# Patient Record
Sex: FEMALE | Race: WHITE | NOT HISPANIC OR LATINO | Employment: UNEMPLOYED | URBAN - METROPOLITAN AREA
[De-identification: names, ages, dates, MRNs, and addresses within clinical notes are randomized per-mention and may not be internally consistent; named-entity substitution may affect disease eponyms.]

---
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
## 2025-01-04 DIAGNOSIS — G47.09 OTHER INSOMNIA: ICD-10-CM

## 2025-01-06 RX ORDER — MIRTAZAPINE 7.5 MG/1
7.5 TABLET, FILM COATED ORAL AT BEDTIME
Qty: 90 TABLET | Refills: 0 | Status: SHIPPED | OUTPATIENT
Start: 2025-01-06

## 2025-01-06 NOTE — TELEPHONE ENCOUNTER
"Pharmacy comment: REQUEST FOR 90 DAYS PRESCRIPTION. DX Code Needed.       Request for medication refill:  mirtazapine (REMERON) 7.5 MG tablet     Providers if patient needs an appointment and you are willing to give a one month supply please refill for one month and  send a letter/MyChart using \".SMILLIMITEDREFILL\" .smillimited and route chart to \"P SMI \" (Giving one month refill in non controlled medications is strongly recommended before denial)    If refill has been denied, meaning absolutely no refills without visit, please complete the smart phrase \".smirxrefuse\" and route it to the \"P SMI MED REFILLS\"  pool to inform the patient and the pharmacy.    Kamran Genao [facility]      "

## 2025-01-16 ENCOUNTER — MYC REFILL (OUTPATIENT)
Dept: FAMILY MEDICINE | Facility: CLINIC | Age: 20
End: 2025-01-16
Payer: COMMERCIAL

## 2025-01-16 DIAGNOSIS — F41.1 GENERALIZED ANXIETY DISORDER: ICD-10-CM

## 2025-01-16 DIAGNOSIS — F40.10 SOCIAL ANXIETY DISORDER: ICD-10-CM

## 2025-01-16 DIAGNOSIS — F90.0 ADHD (ATTENTION DEFICIT HYPERACTIVITY DISORDER), INATTENTIVE TYPE: ICD-10-CM

## 2025-01-16 RX ORDER — METHYLPHENIDATE HYDROCHLORIDE 54 MG/1
54 TABLET ORAL DAILY
Qty: 30 TABLET | Refills: 0 | Status: CANCELLED | OUTPATIENT
Start: 2025-01-16

## 2025-01-16 RX ORDER — BUPROPION HYDROCHLORIDE 300 MG/1
300 TABLET ORAL EVERY MORNING
Qty: 30 TABLET | Refills: 1 | Status: SHIPPED | OUTPATIENT
Start: 2025-01-16

## 2025-01-16 NOTE — TELEPHONE ENCOUNTER
"Last seen 12/11/24    Request for medication refill:buPROPion (WELLBUTRIN XL) 300 MG 24 hr tablet     Providers if patient needs an appointment and you are willing to give a one month supply please refill for one month and  send a letter/MyChart using \".SMILLIMITEDREFILL\" .smillimited and route chart to \"P [facility] \" (Giving one month refill in non controlled medications is strongly recommended before denial)    If refill has been denied, meaning absolutely no refills without visit, please complete the smart phrase \".smirxrefuse\" and route it to the \"P [facility] MED REFILLS\"  pool to inform the patient and the pharmacy.    Briseida Grigsby RN      "

## 2025-01-16 NOTE — TELEPHONE ENCOUNTER
Methylphenidate was sent on 1/10/25 and will drop another script on 2/9/25    Briseida Grigsby RN

## 2025-01-29 ENCOUNTER — VIRTUAL VISIT (OUTPATIENT)
Dept: PSYCHOLOGY | Facility: CLINIC | Age: 20
End: 2025-01-29
Payer: COMMERCIAL

## 2025-01-29 DIAGNOSIS — F33.1 MODERATE EPISODE OF RECURRENT MAJOR DEPRESSIVE DISORDER (H): ICD-10-CM

## 2025-01-29 DIAGNOSIS — F90.0 ADHD (ATTENTION DEFICIT HYPERACTIVITY DISORDER), INATTENTIVE TYPE: ICD-10-CM

## 2025-01-29 DIAGNOSIS — F41.1 GENERALIZED ANXIETY DISORDER: Primary | ICD-10-CM

## 2025-01-29 PROCEDURE — 90837 PSYTX W PT 60 MINUTES: CPT | Mod: 95

## 2025-01-29 NOTE — PROGRESS NOTES
[facility]                                     Progress Note    Patient Name: lEba Adams  Date: 1/29/2025         Service Type: Individual      Session Start Time: 3:02 pm  Session End Time: 3:55 pm     Session Length: 53 min    Session #: 4    Attendees: Client attended alone    Service Modality:  Video Visit:      Provider verified identity through the following two step process.  Patient provided:  Patient address    Telemedicine Visit: The patient's condition can be safely assessed and treated via synchronous audio and visual telemedicine encounter.      Reason for Telemedicine Visit: Patient convenience (e.g. access to timely appointments / distance to available provider)    Originating Site (Patient Location): Patient's home    Distant Site (Provider Location): Provider Remote Setting- Home Office    Consent:  The patient/guardian has verbally consented to: the potential risks and benefits of telemedicine (video visit) versus in person care; bill my insurance or make self-payment for services provided; and responsibility for payment of non-covered services.     Patient would like the video invitation sent by:  My Chart    Mode of Communication:  Video Conference via [employer]    Distant Location (Provider):  Off-site    As the provider I attest to compliance with applicable laws and regulations related to telemedicine.    DATA  Interactive Complexity: No  Crisis: No       Progress Since Last Session (Related to Symptoms / Goals / Homework):   Symptoms: No change , worsening depression    Homework:  n/a      Episode of Care Goals: No improvement - CONTEMPLATION (Considering change and yet undecided); Intervened by assessing the negative and positive thinking (ambivalence) about behavior change     Current / Ongoing Stressors and Concerns:   Fear and anxiety regarding administration efforts. Nightmares with fear and death themes. Struggle with lack of support from extended family  "regarding trans identity. Overwhelm with life-not sure about future, in community college, struggling with homework, attention, task completion, motivation.. Lives in home with mom and her girlfriend, both who also have ADHD. Both work outside of the home most of the time.  Would like to have more activities as a family.   HX parents , moved around a lot, Dad had a previous marriage with a woman who self harmed, leading patient to move to [facility] in 2020. Dad disapproves of mental health supports, medication, therapy. Mom is supportive of medication, understands better.        Treatment Objective(s) Addressed in This Session:   Orientation to current stressors, areas of concern/priority, history  Sleep hygiene  Mindfulness \"Leaves on a stream\"  Mental health support resources     Intervention:   Provided active listening and validation, inquiry regarding present stressors, past influences on current functioning. Shared ADHD skills to support attention to executive functioning, schoolwork. Discussed movement toward engaging in soothing, and enjoyable activities, ie drawing. Provided psychoed on grounding skills       Assessments completed prior to visit:  The following assessments were completed by patient for this visit:  PHQ2:       12/11/2024    10:02 AM 5/23/2024     8:39 AM 11/28/2023     2:46 PM 11/7/2022     4:02 PM 10/7/2022     3:48 PM 5/24/2022     4:00 PM 5/10/2022     2:46 PM   PHQ-2 ( 1999 [employer])   Q1: Little interest or pleasure in doing things 3 2 2 1 2 1 2   Q2: Feeling down, depressed or hopeless 2 2 2 1 2 2 3   PHQ-2 Score 5  4 4       PHQ-2 Total Score (12-17 Years)- Positive if 3 or more points; Administer PHQ-A if positive    2 4 3 5   Q1: Little interest or pleasure in doing things Nearly every day More than half the days        Q2: Feeling down, depressed or hopeless More than half the days More than half the days        PHQ-2 Score 5 4            Patient-reported     PHQ9:       " 10/7/2022     3:49 PM 11/29/2023     5:00 PM 3/15/2024     2:52 PM 5/23/2024     8:39 AM 10/31/2024     9:12 AM 12/9/2024     2:44 PM 12/11/2024    10:02 AM   PHQ-9 SCORE   PHQ-9 Total Score MyChart   18 (Moderately severe depression) 16 (Moderately severe depression) 18 (Moderately severe depression) 16 (Moderately severe depression) 17 (Moderately severe depression)   PHQ-9 Total Score  18 18 16 18  16  17    PHQ-A Total Score 16             Patient-reported     GAD2:       10/31/2024     9:12 AM 12/9/2024     2:44 PM   KRIS-2   Feeling nervous, anxious, or on edge 1 1   Not being able to stop or control worrying 1 1   KRIS-2 Total Score 2  2        Patient-reported     GAD7:       12/14/2021    11:27 AM 12/15/2021     4:02 PM 1/6/2022     5:00 PM 5/10/2022     2:46 PM 5/24/2022     4:00 PM 11/29/2023     5:00 PM 3/15/2024     2:52 PM   KRIS-7 SCORE   Total Score       10 (moderate anxiety)   Total Score 10 11 12 12 5 8 10     CAGE-AID:       3/15/2024     3:03 PM   CAGE-AID Total Score   Total Score 0   Total Score MyChart 0 (A total score of 2 or greater is considered clinically significant)     PROMIS 10-Global Health (only subscores and total score):       10/31/2024     9:13 AM   PROMIS-10 Scores Only   Global Mental Health Score 5    Global Physical Health Score 12    PROMIS TOTAL - SUBSCORES 17        Patient-reported     Taylor Suicide Severity Rating Scale (Lifetime/Recent)      5/19/2021    10:25 PM   Taylor Suicide Severity Rating (Lifetime/Recent)   Q1 Wished to be Dead (Past Month) yes   Q2 Suicidal Thoughts (Past Month) yes   Q3 Suicidal Thought Method no   Q4 Suicidal Intent without Specific Plan yes   Q5 Suicide Intent with Specific Plan no   Q6 Suicide Behavior (Lifetime) no   Level of Risk per Screen high risk         ASSESSMENT: Current Emotional / Mental Status (status of significant symptoms):   Risk status (Self / Other harm or suicidal ideation)   Patient denies current fears or concerns  for personal safety.   Patient reports the following current or recent suicidal ideation or behaviors: passive ideation.   Patient denies current or recent homicidal ideation or behaviors.   Patient denies current or recent self injurious behavior or ideation.   Patient denies other safety concerns.   Patient reports there has been no change in risk factors since their last session.     Patient reports there has been no change in protective factors since their last session.     Recommended that patient call 911 or go to the local ED should there be a change in any of these risk factors     Appearance:   Appropriate    Eye Contact:   Good    Psychomotor Behavior: Restless    Attitude:   Cooperative  Pleasant   Orientation:   All   Speech    Rate / Production: Impoverished     Volume:  Soft    Mood:    Anxious  Depressed    Affect:    Tearful   Thought Content:  Clear    Thought Form:  Coherent    Insight:    Fair      Medication Review:   No changes to current psychiatric medication(s)     Medication Compliance:   Yes       Changes in Health Issues:   None reported     Chemical Use Review:   Substance Use: Chemical use reviewed, no active concerns identified      Tobacco Use: No current tobacco use.      Diagnosis:  1. Generalized anxiety disorder    2. ADHD (attention deficit hyperactivity disorder), inattentive type    3. Moderate episode of recurrent major depressive disorder (H)      Collateral Reports Completed:   Communicated with: PCP    PLAN: (Patient Tasks / Therapist Tasks / Other)  Try sleep hygiene, mindfulness, exercise (walking), grounding skills, take medication, attend appointments (use reminders) use mental health crisis resources as needed     Mildred Roa, LICSW 1/29/25                                                         ______________________________________________________________________  Answers submitted by the patient for this visit:  Patient Health Questionnaire (Submitted on  12/9/2024)  If you checked off any problems, how difficult have these problems made it for you to do your work, take care of things at home, or get along with other people?: Extremely difficult  PHQ9 TOTAL SCORE: 16

## 2025-02-05 ENCOUNTER — OFFICE VISIT (OUTPATIENT)
Dept: FAMILY MEDICINE | Facility: CLINIC | Age: 20
End: 2025-02-05
Payer: COMMERCIAL

## 2025-02-05 VITALS
OXYGEN SATURATION: 97 % | DIASTOLIC BLOOD PRESSURE: 80 MMHG | BODY MASS INDEX: 33.61 KG/M2 | HEIGHT: 65 IN | SYSTOLIC BLOOD PRESSURE: 131 MMHG | HEART RATE: 117 BPM | TEMPERATURE: 98 F | RESPIRATION RATE: 18 BRPM

## 2025-02-05 DIAGNOSIS — F64.0 GENDER DYSPHORIA IN ADULT: Primary | ICD-10-CM

## 2025-02-05 DIAGNOSIS — R00.0 TACHYCARDIA: ICD-10-CM

## 2025-02-05 DIAGNOSIS — Z97.5 NEXPLANON IN PLACE: ICD-10-CM

## 2025-02-05 LAB
ERYTHROCYTE [DISTWIDTH] IN BLOOD BY AUTOMATED COUNT: 13.1 % (ref 10–15)
EST. AVERAGE GLUCOSE BLD GHB EST-MCNC: 111 MG/DL
HBA1C MFR BLD: 5.5 % (ref 0–5.6)
HCT VFR BLD AUTO: 40.4 % (ref 35–53)
HGB BLD-MCNC: 12.8 G/DL (ref 11.7–17.7)
HOLD SPECIMEN: NORMAL
MCH RBC QN AUTO: 26 PG (ref 26.5–33)
MCHC RBC AUTO-ENTMCNC: 31.7 G/DL (ref 31.5–36.5)
MCV RBC AUTO: 82 FL (ref 78–100)
PLATELET # BLD AUTO: 415 10E3/UL (ref 150–450)
RBC # BLD AUTO: 4.93 10E6/UL (ref 3.8–5.9)
WBC # BLD AUTO: 9.9 10E3/UL (ref 4–11)

## 2025-02-05 ASSESSMENT — PATIENT HEALTH QUESTIONNAIRE - PHQ9
SUM OF ALL RESPONSES TO PHQ QUESTIONS 1-9: 15
10. IF YOU CHECKED OFF ANY PROBLEMS, HOW DIFFICULT HAVE THESE PROBLEMS MADE IT FOR YOU TO DO YOUR WORK, TAKE CARE OF THINGS AT HOME, OR GET ALONG WITH OTHER PEOPLE: VERY DIFFICULT
SUM OF ALL RESPONSES TO PHQ QUESTIONS 1-9: 15

## 2025-02-05 NOTE — PROGRESS NOTES
HPI     Name and pronouns: Shen He/him  Patient has primary care outside of [facility]? No, established at [facility]  Seeking masculinizing hormone therapy  How referred to [facility]? PCP/has been in [facility] [place] [facility] in the past    Presents to clinic today with:   Self    In 2022, when Shen was 16 he was seeking care for gender dysphoria and mental health including panic attacks, anxiety, and depression. He was also seeing psychiatry at that time. Stressors included financial instability, moving frequently, parental divorce, and disagreement among extended family about seeking care via hormone therapy. When I last saw him in 2021, he was additionally experiencing suicidal ideation. Now social situation is stable and supportive, lives with mom and partner who is also trans. Estranged from dad which is a relief. In college.         Problem List     Patient Active Problem List   Diagnosis    Asthma    Anxiety    Panic disorder without agoraphobia    Severe episode of recurrent major depressive disorder, without psychotic features (H)    Social anxiety disorder    Vitamin D deficiency    Moderate episode of recurrent major depressive disorder (H)    Attention deficit hyperactivity disorder (ADHD), unspecified ADHD type    Generalized anxiety disorder    Gender dysphoria in adult    Nexplanon in place         Past Medical History     Past Medical History:   Diagnosis Date    ADHD     Anxiety     Depressive disorder     Uncomplicated asthma      Allergies   Allergen Reactions    Seasonal Allergies      Current Outpatient Medications   Medication Sig Dispense Refill    albuterol (PROAIR HFA/PROVENTIL HFA/VENTOLIN HFA) 108 (90 Base) MCG/ACT inhaler Inhale 1-2 puffs into the lungs every 4 hours as needed for shortness of breath, wheezing or cough 18 g 3    buPROPion (WELLBUTRIN XL) 300 MG 24 hr tablet Take 1 tablet (300 mg) by mouth every morning. 30 tablet 1    FLUoxetine (PROZAC) 20 MG capsule TAKE 1 CAPSULE BY MOUTH EVERY  DAY 90 capsule 3    loratadine (CLARITIN) 10 MG tablet TAKE 1 TABLET (10 MG) BY MOUTH DAILY. 90 tablet 3    methylphenidate HCl ER, OSM, (CONCERTA) 54 MG CR tablet Take 1 tablet (54 mg) by mouth daily. 30 tablet 0    [START ON 2/9/2025] methylphenidate HCl ER, OSM, (CONCERTA) 54 MG CR tablet Take 1 tablet (54 mg) by mouth daily. 30 tablet 0    methylphenidate HCl ER, OSM, (CONCERTA) 54 MG CR tablet Take 1 tablet (54 mg) by mouth daily. 30 tablet 0    mirtazapine (REMERON) 7.5 MG tablet TAKE 1 TABLET BY MOUTH AT BEDTIME. 90 tablet 0     History   Smoking Status    Never   Smokeless Tobacco    Never         Surgical History     Past Surgical History:   Procedure Laterality Date    WISDOM TOOTH EXTRACTION          Family History     Family History   Problem Relation Age of Onset    Hypertension Mother     Attention Deficit Disorder Mother     Depression Mother     Sleep Apnea Mother     Heart Disease Maternal Grandfather     Coronary Artery Disease Other        Living environment, Safety     Social History     Socioeconomic History    Marital status: Single     Spouse name: None    Number of children: None    Years of education: None    Highest education level: None   Tobacco Use    Smoking status: Never    Smokeless tobacco: Never   Vaping Use    Vaping status: Never Used   Substance and Sexual Activity    Alcohol use: Never    Drug use: Never    Sexual activity: Never   Social History Narrative    Lives with mom     Estranged from dad      Social Drivers of Health     Financial Resource Strain: Low Risk  (3/15/2024)    Financial Resource Strain     Within the past 12 months, have you or your family members you live with been unable to get utilities (heat, electricity) when it was really needed?: No   Food Insecurity: Low Risk  (3/15/2024)    Food Insecurity     Within the past 12 months, did you worry that your food would run out before you got money to buy more?: No     Within the past 12 months, did the food you  bought just not last and you didn t have money to get more?: No   Transportation Needs: Low Risk  (3/15/2024)    Transportation Needs     Within the past 12 months, has lack of transportation kept you from medical appointments, getting your medicines, non-medical meetings or appointments, work, or from getting things that you need?: No   Physical Activity: Insufficiently Active (3/15/2024)    Exercise Vital Sign     Days of Exercise per Week: 1 day     Minutes of Exercise per Session: 20 min   Stress: Stress Concern Present (3/15/2024)    [employer] [place] [employer] - Occupational Stress Questionnaire     Feeling of Stress : Rather much   Social Connections: Unknown (3/15/2024)    Social Connection and Isolation Panel [NHANES]     Frequency of Social Gatherings with Friends and Family: Never   Interpersonal Safety: Low Risk  (12/11/2024)    Interpersonal Safety     Do you feel physically and emotionally safe where you currently live?: Yes     Within the past 12 months, have you been hit, slapped, kicked or otherwise physically hurt by someone?: No     Within the past 12 months, have you been humiliated or emotionally abused in other ways by your partner or ex-partner?: No   Housing Stability: Low Risk  (3/15/2024)    Housing Stability     Do you have housing? : Yes     Are you worried about losing your housing?: No      Who lives in your household? Mom    Has anyone hurt you physically, for example by pushing, hitting, slapping or kicking you or forcing you to have sex? Denies  Do you feel threatened or controlled by a partner, ex-partner or anyone in your life? Denies      Mental Health Assessment     Have you ever felt depressed because your gender identity and body don t match? Feels that a significant portion of mental health is related to gender.     Mental Health diagnosis history Yes  Mental health hospitalizations Yes, around June 2021 for suicidal ideation.   History of suicide  attempts  No  Current suicidal thoughts?  No safety concerns.   Self harm   History in past   Yes   Current   No. No safety concerns.   Non gender related Therapist? Yes  Gender therapist?    No    - ADHD  - Depression   - Insomnia    He reports that mental health has been poorer recently, especially related to sociopolitical climate. He is worried about violence and having a paper trail of engaging in gender care. Is not having panic attacks often. Panic attacks have decreased to a couple of times a year since starting ADHD medication.     Therapy with Mildred Roa.         12/9/2024     2:44 PM 12/11/2024    10:02 AM 2/5/2025     9:14 AM   PHQ-9 SCORE   PHQ-9 Total Score MyChart 16 (Moderately severe depression) 17 (Moderately severe depression) 15 (Moderately severe depression)   PHQ-9 Total Score 16  17  15        Patient-reported         5/24/2022     4:00 PM 11/29/2023     5:00 PM 3/15/2024     2:52 PM   KRIS-7 SCORE   Total Score   10 (moderate anxiety)   Total Score 5 8 10           Gender Journey     Gender identity   What words do you use to describe your gender identity? Male although at school he will allow they/them pronouns to give others flexibility.  What about gender expression? Feels more masculine after cutting hair shorter.   Where do you want your presentation to be? Flat chest, fewer curves, body weight distribution changes.  What sex were you assigned at birth? female      Gender history  When did you first recognize that your body and gender identity didn t match?  Please refer to original consult in 2021.    Pre-pubertal experience   Please refer to original consult in 2021.    Pubertal experience  Please refer to original consult in 2021.      Current body symptoms  What parts of your body or presentation make you feel uncomfortable or cause dysphoria?  Breast tissue.  Is worried about masculinizing while still having breast tissue.    Have you ever seen a healthcare provider for  "gender-affirming medical interventions? Yes. Is working with PCP. Patient reports that plan with PCP is to improve mental health prior to starting testosterone.     Other types of supports you are using or want to start using:   Haircuts/style and gender affirmative clothing. Getting haircuts is a source of gender euphoria    Social supports  Who supports you for you?   Relationship with mom is okay. Mom is supportive. Mom's partner is supportive.     Dad and grandparents are not supportive.     How do you spend your time? (Social activities, extracurricular activities, school, sports, volunteering, susan based activities, community involvement)  Is in college part time at [employer]. Has tried to join queer clubs at school but has not fit with his schedule.     Draws, plays video games.     How well is your gender identity accepted and supported in each of these environments?  Is \"sort of\" out at school. Will give hey/they pronouns if asked by anyone.     Embodiment goals in gender identity alignment  I would like these parts of my body to stay the same:     I am interested in changing these parts of my body or presentation:   Top surgery, voice lowering, body fat redistribution.     Nexplanon has suppressed periods but is having a return of regular periods and heavier flow. Nexplanon was placed around 3 years ago.          Review of Systems:   Review of Systems   ROS: 10 point ROS neg other than the symptoms noted above in the HPI.    This document serves as a record of the services and decisions personally performed and made by Candelaria Barnes MD. It was created on his/her behalf by Caleb Stanley, a trained medical scribe. The creation of this document is based the provider's statements to the medical scribe.  Angella Stanley 3:04 PM, February 5, 2025          Physical Exam:     Vitals:    02/05/25 1447   BP: 131/80   Pulse: 117   Resp: 18   Temp: 98  F (36.7  C)   TempSrc: Oral " "  SpO2: 97%   Height: 1.651 m (5' 5\")     GENERAL:: healthy, alert and no distress  RESP: lungs clear to auscultation - no rales, no rhonchi, no wheezes  CV: regular rates and rhythm, repeat pulse 100, normal S1 S2, no S3 or S4 and no murmur, no click or rub -  PSYCH: Dressed appropriately for weather and occasion. Behavior cooperative. Eye contact normal. Speech is regular in rate, volume, and prosody. Thought content negative for safety concerns. Thought process linear and logical. Fidgeting a bit with his hands. Affect anxious, full range and appropriate.     Assessment and Plan      Shen is a 19 year old individual that uses  pronouns He/Him/His/Himself that presents today for interest in masculinizing treatment to better align their body with their gender identity.    Assessment & Plan   Gender dysphoria in adult  Long standing gender dysphoria and now has been seeing this clinic since 2021 with the intention of starting masculinizing therapy with testosterone. Has excellent support through mom and her partner, therapy, and is estranged from other members of the family who historically have not been supportive. In the last 4 years has had a very consistent and stable gender identity. Mental health has improved in terms of cutting urges which were present earlier but haven't more recently, though anxiety with the current sociopolitical climate is high. Panic attacks have greatly reduced. Feels that strongest barrier to mental health improvement is lack of gender affirming medical care. Given that it has been since 2022 for labs, we will repeat those today. He reports a concern of possible low iron in the past, so we did reflex to iron studies. This will complete his physical workup. We did address fertility and contraception. He has no current romantic or sexual relationships. Co-occurring medical concerns are limited to mildly elevated triglycerides and carrying extra weight, neither of which are " contraindications. Mental health outcomes are likely to be improved with treatments. In addition, he is seeing his PCP regularly for medication management of his anxiety.     I am recommending he follow-up with PCP to review labs and begin the informed consent process. I also asked care coordination to schedule an additional visit 2 weeks following his next appointment to continue this process. He's aware that this process may take some time. Plan to pursue topical testosterone therapy. Encouraged him to continue to engage with other LGBTQ organizations and suggested a visit to [facility]. In the mean time, he will also explore some top surgeons.   - Lipid panel reflex to direct LDL Non-fasting  - Comprehensive metabolic panel  - Hemoglobin A1c  - CBC with Platelets and Reflex to Iron Studies  - Lipid panel reflex to direct LDL Non-fasting  - Comprehensive metabolic panel  - Hemoglobin A1c  - CBC with Platelets and Reflex to Iron Studies  - Iron & Iron Binding Capacity  - Ferritin    Tachycardia  This was notable initially after rooming, but it improved greatly. I suspect related to anxiety and medications as he recently increased bupropion dose and is additionally on methylphenidate.     Nexplanon in place  Has had it for 3 years. Feels that bleeding has gotten more persistent. He would like to see if this changes with testosterone therapy and if not would like to remove and replace.     51 minutes spent by me on the date of the encounter doing chart review, history and exam, documentation and further activities per the note    No follow-ups on file.      Embodiment Goals  Educated about treatments for young people are directed to symptom management, and in this case, should focus on: top surgery, fat redistribution, voice change    This patient meets DSM V Criteria for gender dysphoria in adults and adolescents.

## 2025-02-05 NOTE — PATIENT INSTRUCTIONS
Consider checking out [facility].   Top Surgeon options  Willian Garcia [phone] at [facility]  Radha Zhang at [facility] [place] [phone]  Calos Walters  I will message Dr. Laurent. Follow-up with her as scheduled.

## 2025-02-05 NOTE — Clinical Note
Caty: saw Shen today/. So anxious about current sociopolitical environment, but I think tachycardia could be anxiety or even from combo of wellbutrin and stimulants. Something to think about. I think his anxiety is also largely driven by dysphoria and after 4 years of considering testosterone, it is time. I think testosterone will be stabilizing and not anxiety provoking. I repeated labs since it has been 2 years, and did top surgery referrals. He will see you in 2 weeks for anxiety and review labs. If you have time, start talking informed consent for testosterone (and you will get as far as you get - he knows could be multiple visits). He wants top;ical. Probablyl needs 2 pumps daily . Already addressed fertility and has nexplanon. Let me know if you have ?'s

## 2025-02-06 LAB
ALBUMIN SERPL BCG-MCNC: 4.6 G/DL (ref 3.5–5.2)
ALP SERPL-CCNC: 126 U/L (ref 40–260)
ALT SERPL W P-5'-P-CCNC: 20 U/L (ref 0–50)
ANION GAP SERPL CALCULATED.3IONS-SCNC: 13 MMOL/L (ref 7–15)
AST SERPL W P-5'-P-CCNC: 21 U/L (ref 0–35)
BILIRUB SERPL-MCNC: <0.2 MG/DL
BUN SERPL-MCNC: 9.4 MG/DL (ref 6–20)
CALCIUM SERPL-MCNC: 9.7 MG/DL (ref 8.8–10.4)
CHLORIDE SERPL-SCNC: 104 MMOL/L (ref 98–107)
CHOLEST SERPL-MCNC: 167 MG/DL
CREAT SERPL-MCNC: 0.65 MG/DL (ref 0.51–1.17)
EGFRCR SERPLBLD CKD-EPI 2021: >90 ML/MIN/1.73M2
FASTING STATUS PATIENT QL REPORTED: NO
FASTING STATUS PATIENT QL REPORTED: NO
FERRITIN SERPL-MCNC: 29 NG/ML (ref 6–409)
GLUCOSE SERPL-MCNC: 113 MG/DL (ref 70–99)
HCO3 SERPL-SCNC: 24 MMOL/L (ref 22–29)
HDLC SERPL-MCNC: 47 MG/DL
IRON BINDING CAPACITY (ROCHE): 342 UG/DL (ref 240–430)
IRON SATN MFR SERPL: 10 % (ref 15–46)
IRON SERPL-MCNC: 34 UG/DL (ref 37–157)
LDLC SERPL CALC-MCNC: 87 MG/DL
NONHDLC SERPL-MCNC: 120 MG/DL
POTASSIUM SERPL-SCNC: 4.6 MMOL/L (ref 3.4–5.3)
PROT SERPL-MCNC: 7.9 G/DL (ref 6.4–8.3)
SODIUM SERPL-SCNC: 141 MMOL/L (ref 135–145)
TRIGL SERPL-MCNC: 165 MG/DL

## 2025-02-13 ENCOUNTER — PATIENT OUTREACH (OUTPATIENT)
Dept: CARE COORDINATION | Facility: CLINIC | Age: 20
End: 2025-02-13
Payer: COMMERCIAL

## 2025-02-19 ENCOUNTER — VIRTUAL VISIT (OUTPATIENT)
Facility: CLINIC | Age: 20
End: 2025-02-19
Payer: COMMERCIAL

## 2025-02-19 DIAGNOSIS — F64.2 GENDER DYSPHORIA IN CHILDHOOD: ICD-10-CM

## 2025-02-19 DIAGNOSIS — F90.0 ADHD (ATTENTION DEFICIT HYPERACTIVITY DISORDER), INATTENTIVE TYPE: ICD-10-CM

## 2025-02-19 DIAGNOSIS — F33.1 MODERATE EPISODE OF RECURRENT MAJOR DEPRESSIVE DISORDER (H): Primary | ICD-10-CM

## 2025-02-19 DIAGNOSIS — F41.1 GENERALIZED ANXIETY DISORDER: ICD-10-CM

## 2025-02-19 PROCEDURE — 90837 PSYTX W PT 60 MINUTES: CPT | Mod: 95

## 2025-02-19 ASSESSMENT — ANXIETY QUESTIONNAIRES
GAD7 TOTAL SCORE: 9
8. IF YOU CHECKED OFF ANY PROBLEMS, HOW DIFFICULT HAVE THESE MADE IT FOR YOU TO DO YOUR WORK, TAKE CARE OF THINGS AT HOME, OR GET ALONG WITH OTHER PEOPLE?: SOMEWHAT DIFFICULT
7. FEELING AFRAID AS IF SOMETHING AWFUL MIGHT HAPPEN: SEVERAL DAYS
1. FEELING NERVOUS, ANXIOUS, OR ON EDGE: MORE THAN HALF THE DAYS
8. IF YOU CHECKED OFF ANY PROBLEMS, HOW DIFFICULT HAVE THESE MADE IT FOR YOU TO DO YOUR WORK, TAKE CARE OF THINGS AT HOME, OR GET ALONG WITH OTHER PEOPLE?: SOMEWHAT DIFFICULT
2. NOT BEING ABLE TO STOP OR CONTROL WORRYING: SEVERAL DAYS
GAD7 TOTAL SCORE: 9
1. FEELING NERVOUS, ANXIOUS, OR ON EDGE: MORE THAN HALF THE DAYS
4. TROUBLE RELAXING: MORE THAN HALF THE DAYS
3. WORRYING TOO MUCH ABOUT DIFFERENT THINGS: SEVERAL DAYS
4. TROUBLE RELAXING: MORE THAN HALF THE DAYS
5. BEING SO RESTLESS THAT IT IS HARD TO SIT STILL: NOT AT ALL
GAD7 TOTAL SCORE: 9
IF YOU CHECKED OFF ANY PROBLEMS ON THIS QUESTIONNAIRE, HOW DIFFICULT HAVE THESE PROBLEMS MADE IT FOR YOU TO DO YOUR WORK, TAKE CARE OF THINGS AT HOME, OR GET ALONG WITH OTHER PEOPLE: SOMEWHAT DIFFICULT
GAD7 TOTAL SCORE: 9
6. BECOMING EASILY ANNOYED OR IRRITABLE: MORE THAN HALF THE DAYS
2. NOT BEING ABLE TO STOP OR CONTROL WORRYING: SEVERAL DAYS
5. BEING SO RESTLESS THAT IT IS HARD TO SIT STILL: NOT AT ALL
3. WORRYING TOO MUCH ABOUT DIFFERENT THINGS: SEVERAL DAYS
7. FEELING AFRAID AS IF SOMETHING AWFUL MIGHT HAPPEN: SEVERAL DAYS
6. BECOMING EASILY ANNOYED OR IRRITABLE: MORE THAN HALF THE DAYS
IF YOU CHECKED OFF ANY PROBLEMS ON THIS QUESTIONNAIRE, HOW DIFFICULT HAVE THESE PROBLEMS MADE IT FOR YOU TO DO YOUR WORK, TAKE CARE OF THINGS AT HOME, OR GET ALONG WITH OTHER PEOPLE: SOMEWHAT DIFFICULT

## 2025-02-19 NOTE — PROGRESS NOTES
[facility]                                     Progress Note    Patient Name: Elba Adams  Date: 2/19/2025         Service Type: Individual      Session Start Time: 4:00 pm  Session End Time: 4:55 pm     Session Length: 55 min    Session #: 5    Attendees: Client attended alone    Service Modality:  Video Visit:      Provider verified identity through the following two step process.  Patient provided:  Patient address    Telemedicine Visit: The patient's condition can be safely assessed and treated via synchronous audio and visual telemedicine encounter.      Reason for Telemedicine Visit: Patient convenience (e.g. access to timely appointments / distance to available provider)    Originating Site (Patient Location): Patient's home    Distant Site (Provider Location): Provider Remote Setting- Home Office    Consent:  The patient/guardian has verbally consented to: the potential risks and benefits of telemedicine (video visit) versus in person care; bill my insurance or make self-payment for services provided; and responsibility for payment of non-covered services.     Patient would like the video invitation sent by:  My Chart    Mode of Communication:  Video Conference via Amwell    Distant Location (Provider):  Off-site    As the provider I attest to compliance with applicable laws and regulations related to telemedicine.    DATA  Interactive Complexity: No  Crisis: No  Extended Session (53+ minutes): PROLONGED SERVICE IN THE OUTPATIENT SETTING REQUIRING DIRECT (FACE-TO-FACE) PATIENT CONTACT BEYOND THE USUAL SERVICE:    - Patient's presenting concerns require more intensive intervention than could be completed within the usual service  Interactive Complexity: No  Crisis: No       Progress Since Last Session (Related to Symptoms / Goals / Homework):   Symptoms: Improving gradually, increasing engagement, hope    Homework:  n/a      Episode of Care Goals: No improvement - CONTEMPLATION  "(Considering change and yet undecided); Intervened by assessing the negative and positive thinking (ambivalence) about behavior change     Current / Ongoing Stressors and Concerns:   Fear and anxiety regarding administration efforts. Nightmares with fear and death themes. Struggle with lack of support from extended family regarding trans identity. Overwhelm with life-not sure about future, in community college, struggling with homework, attention, task completion, motivation.. Lives in home with mom and her girlfriend, both who also have ADHD. Both work outside of the home most of the time.  Would like to have more activities as a family.   HX parents , moved around a lot, Dad had a previous marriage with a woman who self harmed, leading patient to move to [place] [place] in 2020. Dad disapproves of mental health supports, medication, therapy. Mom is supportive of medication, understands better.        Treatment Objective(s) Addressed in This Session:   Orientation to current stressors, areas of concern/priority, history  Sleep hygiene  Mindfulness \"Leaves on a stream\"  Mental health support resources     Intervention:   Provided active listening and validation, inquiry regarding present stressors, past influences on current functioning. Shared ADHD skills to support attention to executive functioning, schoolwork. Discussed movement toward engaging in soothing, and enjoyable activities, ie drawing. Provided psychoed on grounding skills   Celebrated improvements in outlook/functioning    Assessments completed prior to visit:  The following assessments were completed by patient for this visit:  PHQ2:       12/11/2024    10:02 AM 5/23/2024     8:39 AM 11/28/2023     2:46 PM 11/7/2022     4:02 PM 10/7/2022     3:48 PM 5/24/2022     4:00 PM 5/10/2022     2:46 PM   PHQ-2 ( 1999 [employer])   Q1: Little interest or pleasure in doing things 3 2 2 1 2 1 2   Q2: Feeling down, depressed or hopeless 2 2 2 1 2 2 3   PHQ-2 Score 5  4 4  "      PHQ-2 Total Score (12-17 Years)- Positive if 3 or more points; Administer PHQ-A if positive    2 4 3 5   Q1: Little interest or pleasure in doing things Nearly every day More than half the days        Q2: Feeling down, depressed or hopeless More than half the days More than half the days        PHQ-2 Score 5 4            Patient-reported     PHQ9:       11/29/2023     5:00 PM 3/15/2024     2:52 PM 5/23/2024     8:39 AM 10/31/2024     9:12 AM 12/9/2024     2:44 PM 12/11/2024    10:02 AM 2/5/2025     9:14 AM   PHQ-9 SCORE   PHQ-9 Total Score MyChart  18 (Moderately severe depression) 16 (Moderately severe depression) 18 (Moderately severe depression) 16 (Moderately severe depression) 17 (Moderately severe depression) 15 (Moderately severe depression)   PHQ-9 Total Score 18 18 16 18  16  17  15        Patient-reported     GAD2:       10/31/2024     9:12 AM 12/9/2024     2:44 PM 2/19/2025     3:30 PM   KRIS-2   Feeling nervous, anxious, or on edge 1 1 2   Not being able to stop or control worrying 1 1 1   KRSI-2 Total Score 2  2  3        Patient-reported     GAD7:       12/15/2021     4:02 PM 1/6/2022     5:00 PM 5/10/2022     2:46 PM 5/24/2022     4:00 PM 11/29/2023     5:00 PM 3/15/2024     2:52 PM 2/19/2025     3:30 PM   KRIS-7 SCORE   Total Score      10 (moderate anxiety) 9 (mild anxiety)   Total Score 11 12 12 5 8 10 9        Patient-reported     CAGE-AID:       3/15/2024     3:03 PM   CAGE-AID Total Score   Total Score 0   Total Score MyChart 0 (A total score of 2 or greater is considered clinically significant)     PROMIS 10-Global Health (only subscores and total score):       10/31/2024     9:13 AM 2/19/2025     3:30 PM   PROMIS-10 Scores Only   Global Mental Health Score 5  6    Global Physical Health Score 12  13    PROMIS TOTAL - SUBSCORES 17  19        Patient-reported     Hodgen Suicide Severity Rating Scale (Lifetime/Recent)      5/19/2021    10:25 PM   Hodgen Suicide Severity Rating  (Lifetime/Recent)   Q1 Wished to be Dead (Past Month) yes   Q2 Suicidal Thoughts (Past Month) yes   Q3 Suicidal Thought Method no   Q4 Suicidal Intent without Specific Plan yes   Q5 Suicide Intent with Specific Plan no   Q6 Suicide Behavior (Lifetime) no   Level of Risk per Screen high risk         ASSESSMENT: Current Emotional / Mental Status (status of significant symptoms):   Risk status (Self / Other harm or suicidal ideation)   Patient denies current fears or concerns for personal safety.   Patient denies current or recent suicidal ideation or behaviors.   Patient denies current or recent homicidal ideation or behaviors.   Patient denies current or recent self injurious behavior or ideation.   Patient denies other safety concerns.   Patient reports there has been no change in risk factors since their last session.     Patient reports there has been no change in protective factors since their last session.     Recommended that patient call 911 or go to the local ED should there be a change in any of these risk factors     Appearance:   Appropriate    Eye Contact:   Good    Psychomotor Behavior: Normal    Attitude:   Cooperative  Pleasant   Orientation:   All   Speech    Rate / Production: Normal/ Responsive    Volume:  Soft    Mood:    Anxious  Depressed    Affect:    Appropriate    Thought Content:  Clear    Thought Form:  Coherent    Insight:    Fair      Medication Review:   No changes to current psychiatric medication(s)     Medication Compliance:   Yes       Changes in Health Issues:   None reported     Chemical Use Review:   Substance Use: Chemical use reviewed, no active concerns identified      Tobacco Use: No current tobacco use.      Diagnosis:  1. Moderate episode of recurrent major depressive disorder (H)    2. Generalized anxiety disorder    3. ADHD (attention deficit hyperactivity disorder), inattentive type        Collateral Reports Completed:   Communicated with: PCP    PLAN: (Patient Tasks /  Therapist Tasks / Other)  Sleep hygiene, mindfulness, exercise (walking), grounding skills, spend time on art, do homework at school  take medication, attend appointments (use reminders) use mental health crisis resources as needed, explore trans resources, ike tapia LICSW 2/19/25                                                      ______________________________________________________________________                                                Individual Treatment Plan    Patient's Name: Elba Adams  YOB: 2005    Date of Creation: 2/19/25  Date Treatment Plan Last Reviewed/Revised:     DSM5 Diagnoses: 296.32 (F33.1) Major Depressive Disorder, Recurrent Episode, Moderate With anxious distress ADHD, combined type; Gender Dysphoria  Psychosocial / Contextual Factors: social anxiety, pandemic, isolation, gender   PROMIS (reviewed every 90 days):     Referral / Collaboration:  Was/were discussed and patient will pursue.  [facility] support groups, [facility]    Anticipated number of session for this episode of care:  tbd  Anticipation frequency of session:  Every 2-3 weeks  Anticipated Duration of each session: 53 or more minutes  Treatment plan will be reviewed in 90 days or when goals have been changed.       MeasurableTreatment Goal(s) related to diagnosis / functional impairment(s)  Goal 1: Patient will address mental health concerns utilizing skills, resources, values and actions    I will know I've met my goal when tbc.      Patient has not reviewed nor agreed to the above plan.      MORRIS Moreno  February 19, 2025

## 2025-02-20 ASSESSMENT — PATIENT HEALTH QUESTIONNAIRE - PHQ9
SUM OF ALL RESPONSES TO PHQ QUESTIONS 1-9: 16
SUM OF ALL RESPONSES TO PHQ QUESTIONS 1-9: 16
10. IF YOU CHECKED OFF ANY PROBLEMS, HOW DIFFICULT HAVE THESE PROBLEMS MADE IT FOR YOU TO DO YOUR WORK, TAKE CARE OF THINGS AT HOME, OR GET ALONG WITH OTHER PEOPLE: VERY DIFFICULT

## 2025-02-21 ENCOUNTER — OFFICE VISIT (OUTPATIENT)
Dept: FAMILY MEDICINE | Facility: CLINIC | Age: 20
End: 2025-02-21
Payer: COMMERCIAL

## 2025-02-21 VITALS
SYSTOLIC BLOOD PRESSURE: 121 MMHG | HEART RATE: 105 BPM | HEIGHT: 65 IN | RESPIRATION RATE: 17 BRPM | DIASTOLIC BLOOD PRESSURE: 75 MMHG | OXYGEN SATURATION: 99 % | BODY MASS INDEX: 33.61 KG/M2 | TEMPERATURE: 98.2 F

## 2025-02-21 DIAGNOSIS — F64.0 GENDER DYSPHORIA IN ADULT: ICD-10-CM

## 2025-02-21 DIAGNOSIS — F33.2 SEVERE EPISODE OF RECURRENT MAJOR DEPRESSIVE DISORDER, WITHOUT PSYCHOTIC FEATURES (H): ICD-10-CM

## 2025-02-21 DIAGNOSIS — F90.0 ADHD (ATTENTION DEFICIT HYPERACTIVITY DISORDER), INATTENTIVE TYPE: Primary | ICD-10-CM

## 2025-02-21 DIAGNOSIS — R00.0 TACHYCARDIA: ICD-10-CM

## 2025-02-21 DIAGNOSIS — F40.10 SOCIAL ANXIETY DISORDER: ICD-10-CM

## 2025-02-21 DIAGNOSIS — E61.1 LOW SERUM IRON: ICD-10-CM

## 2025-02-21 DIAGNOSIS — F41.1 GENERALIZED ANXIETY DISORDER: ICD-10-CM

## 2025-02-21 RX ORDER — METHYLPHENIDATE HYDROCHLORIDE 54 MG/1
54 TABLET ORAL DAILY
Qty: 30 TABLET | Refills: 0 | Status: SHIPPED | OUTPATIENT
Start: 2025-03-23 | End: 2025-04-22

## 2025-02-21 RX ORDER — METHYLPHENIDATE HYDROCHLORIDE 54 MG/1
54 TABLET ORAL DAILY
Qty: 30 TABLET | Refills: 0 | Status: CANCELLED | OUTPATIENT
Start: 2025-02-21

## 2025-02-21 RX ORDER — BUPROPION HYDROCHLORIDE 150 MG/1
150 TABLET ORAL EVERY MORNING
Qty: 90 TABLET | Refills: 3 | Status: SHIPPED | OUTPATIENT
Start: 2025-02-21

## 2025-02-21 RX ORDER — METHYLPHENIDATE HYDROCHLORIDE 54 MG/1
54 TABLET ORAL DAILY
Qty: 30 TABLET | Refills: 0 | Status: SHIPPED | OUTPATIENT
Start: 2025-02-21 | End: 2025-03-23

## 2025-02-21 RX ORDER — FERROUS SULFATE 325(65) MG
325 TABLET ORAL EVERY OTHER DAY
Qty: 60 TABLET | Refills: 0 | Status: SHIPPED | OUTPATIENT
Start: 2025-02-21

## 2025-02-21 RX ORDER — METHYLPHENIDATE HYDROCHLORIDE 54 MG/1
54 TABLET ORAL DAILY
Qty: 30 TABLET | Refills: 0 | Status: SHIPPED | OUTPATIENT
Start: 2025-04-22 | End: 2025-05-22

## 2025-02-21 NOTE — PROGRESS NOTES
Assessment & Plan     Gender dysphoria in adult  We have been having ongoing discussions regarding informed consent, expected timeline of changes, and various options for T. Discussed concerns for potential worsening of mental health with T, but feel that mental health with improve with T. Shen would like to start with Androgel and re-assess in 1 month.   - testosterone (ANDROGEL 1.62 % PUMP) 20.25 MG/ACT gel; Place 2 Pump (40.5 mg) onto   the skin daily. Apply from dispenser to clean, dry, intact skin of the upper arms and shoulders.          ADHD (attention deficit hyperactivity disorder), inattentive type  Stable, 3 month refill provided.   - buPROPion (WELLBUTRIN XL) 150 MG 24 hr tablet; Take 1 tablet (150 mg) by mouth every morning.  - methylphenidate HCl ER, OSM, (CONCERTA) 54 MG CR tablet; Take 1 tablet (54 mg) by mouth daily.  - methylphenidate HCl ER, OSM, (CONCERTA) 54 MG CR tablet; Take 1 tablet (54 mg) by mouth daily.  - methylphenidate HCl ER, OSM, (CONCERTA) 54 MG CR tablet; Take 1 tablet (54 mg) by mouth daily.    Tachycardia   Denies symptoms, mild. Discussed further testing with EKG and TSH, declined at this visit. Mildly tachycardic on exam with regular rhythm. Could be medication side effect from concerta and Wellbutrin.     Generalized anxiety disorder  Social anxiety disorder  MDD   Chronic, improving. Currently on prozac, wellbutrin, concerta, remeron. Ongoing discussions with therapist regarding, Treatment Resistant Deprssion. Patient appears to be in better spirits and feel that mood is positively influenced with the potential to start T soon. Decreased dose of Wellbutrin due to concerns for slight tachycardia and increased anxiety. Will re-schedule appointment with Dr. Gomez for med management.  - buPROPion (WELLBUTRIN XL) 150 MG 24 hr tablet; Take 1 tablet (150 mg) by mouth every morning.      Low serum iron  Stable, refill provided.   - ferrous sulfate (FEROSUL) 325 (65 Fe) MG tablet;  Take 1 tablet (325 mg) by mouth every other day.        The longitudinal plan of care for the diagnosis(es)/condition(s) as documented were addressed   during this visit. Due to the added complexity in care, I will continue to support Shen in the subsequent management and with ongoing continuity of care.    Return in about 4 weeks (around 3/21/2025) for HRT Fu .    Nena Gotti is a 19 year old, presenting for the following health issues:  Follow Up (Lab follow up )        2/21/2025     8:35 AM   Additional Questions   Roomed by Tarik   Accompanied by self         2/21/2025   Declines Weight   Did patient decline having their weight taken? Yes         2/21/2025    Information    services provided? No     Mental Health   - Doing a lot better with the prospect of starting T   - Did not find benefit with increased dose of Wellbutrin  - Discussed seeing Dr. Gomez for a visit to optimize mental health medications   - 3 month refill of concerta provided.     Tachycardia   - Denies CP, Dyspnea, Palpitations, lightheaded, dizziness.   - Would like to do EKG and blood work at another visit.     HRT  - Visit with Dr. Cameron al well  - Reviewed labs performed at that visit   - Interested in Androgel   - Reviewed Informed consent process.      Answers submitted by the patient for this visit:  Patient Health Questionnaire (Submitted on 2/20/2025)  If you checked off any problems, how difficult have these problems made it for you to do your work, take care of things at home, or get along with other people?: Very difficult  PHQ9 TOTAL SCORE: 16  Patient Health Questionnaire (G7) (Submitted on 2/19/2025)  KRIS 7 TOTAL SCORE: 9        Review of Systems  Constitutional, HEENT, cardiovascular, pulmonary, GI, , musculoskeletal, neuro, skin, endocrine and psych systems are negative, except as otherwise noted.      Objective    /75   Pulse 105   Temp 98.2  F (36.8  C) (Oral)   Resp 17   Ht 1.651  "m (5' 5\")   LMP 01/20/2025 (Approximate)   SpO2 99%   BMI 33.61 kg/m    Body mass index is 33.61 kg/m .  Physical Exam  Vitals reviewed.   Constitutional:       General: He is not in acute distress.     Appearance: Normal appearance. He is not ill-appearing.   HENT:      Head: Normocephalic and atraumatic.   Eyes:      Conjunctiva/sclera: Conjunctivae normal.   Cardiovascular:      Rate and Rhythm: Regular rhythm. Tachycardia present.   Pulmonary:      Effort: Pulmonary effort is normal.      Breath sounds: Normal breath sounds.   Abdominal:      Palpations: Abdomen is soft.      Tenderness: There is no abdominal tenderness.   Skin:     Capillary Refill: Capillary refill takes less than 2 seconds.   Neurological:      General: No focal deficit present.      Mental Status: He is alert. Mental status is at baseline.   Psychiatric:         Mood and Affect: Mood normal.         Behavior: Behavior normal.         Thought Content: Thought content normal.         Judgment: Judgment normal.                Signed Electronically by: Caty Laurent DO"

## 2025-02-21 NOTE — PATIENT INSTRUCTIONS
Informed Consent Form for Masculinizing Medication (Testosterone)      This form refers to the use of testosterone by persons who wish to masculinize their body. While there are risks associated with taking testosterone, when appropriately prescribed it can greatly improve mental health and quality of life. Please seek another opinion if you want additional perspective on any aspect of your care.    Masculinizing Effects    I understand that testosterone may be prescribed to masculinize my body.    2.   I understand that the masculinizing effects of testosterone can take several months to a year to become noticeable, that the rate and degree of change can t be predicted and is different for every person, and that changes may not be complete for 2-5 years after I start testosterone.    3.   I understand that these changes will likely be permanent even if I stop taking testosterone:  Lower voice pitch (i.e., voice becoming deeper).  Increased growth of hair, with thicker/coarser hairs, on arms, legs, chest, back, and abdomen.  Gradual growth of moustache/facial hair.  Hair loss at the temples and crown of the head, with the possibility of becoming completely bald.  Genital changes may or may not be permanent if testosterone is stopped. These include clitoral growth (typically 1-3 cm) and vaginal dryness.    4.   I understand that the following changes are usually not permanent (that is, they will likely reverse if I stop taking testosterone).  Acne, which may be severe and can cause permanent scarring if not treated.   Fat may redistribute to a more masculine pattern (decreased on buttocks/hips/thighs, increased in abdomen - changing from  pear shape  to  apple shape ).  Increased muscle mass and upper body strength.  Increased libido (sex drive).  Menstrual periods typically stop within 3-6 months of starting testosterone.    5.   I understand that it is not known what the effects of testosterone are on fertility.    Fertility is reduced and I may never be able to get pregnant, even if I stop testosterone.   On the other hand, even if my period stops, it may still be possible for me to get pregnant, and I am aware of birth control options (if applicable).   I have been informed that I CANNOT take testosterone if I am pregnant.   I should consider egg banking if I desire biological children.     6. I understand that there are some aspects of my body that will not be changed by testosterone:  Breasts may appear slightly smaller due to fat loss, but will not substantially shrink.  Although voice pitch will likely drop, other aspects of speech will not become more masculine.      Risks of Testosterone    I understand that the medical effects and safety of testosterone are not fully understood, and that there may be long-term risks that are not yet known.    2.   I understand that I am strongly advised not to take more testosterone than I am prescribed, as this increases health risks. I have been informed that taking more will not make masculinization happen more quickly or increase the degree of change.    3.   I understand that testosterone can cause changes that increase my risk of heart disease, including:  decreasing good cholesterol (HDL) and increasing bad cholesterol (LDL)  increasing blood pressure  increasing deposits of fat around my internal organs    4.   I have been advised that my risks of heart disease are greater if people in my family have had heart disease, if I am overweight, or if I smoke.    5.   I have been advised that heart health checkups, including monitoring of my weight and cholesterol levels, should be done periodically as long as I am taking testosterone.    6.   I understand that testosterone can damage the liver, possibly leading to liver disease. I have been advised that I should be monitored for as long as I am taking testosterone.    7.   I understand that testosterone can increase the amount of  red blood cells and hemoglobin in my blood. While the increase is usually only to a normal male range (which does not pose health risks), a high increase can cause potentially life-threatening problems such as stroke and heart attack. I have been advised that my blood should be monitored periodically while I am taking testosterone.    8.   I understand that taking testosterone can increase my risk for diabetes by decreasing my body s response to insulin, causing weight gain, and increasing deposits of fat around my internal organs. I have been advised that my fasting blood glucose should be monitored periodically while I am taking testosterone.    9.   I understand that it is not known if testosterone increases the risk of ovarian, breast, or uterine cancer.    10. I understand that taking testosterone can lead to my cervix and the walls of my vagina becoming more fragile, and that this can lead to tears or abrasions that increase the risk of sexually transmitted infections (including HIV) if I have vaginal sex - no matter what the gender of my partner is. I have been advised that [place] discussion with my doctor about my sexual practices can help determine how best to prevent and monitor for sexually transmitted infections.    11. I have been informed that testosterone can cause headaches or migraines. I understand that if I am frequently having headaches or migraines, or the pain is unusually severe, it is recommended that I talk with my healthcare provider.    12. I understand that testosterone can cause emotional changes, including increased irritability, frustration, and anger. I have been advised that my doctor can assist me in finding resources to explore and cope with these changes.    13. I understand that testosterone will result in changes that will be noticeable by other people, and that some people in similar circumstances have experienced harassment, discrimination, and violence, while others have lost  support of loved ones. I have been advised that my doctor can assist me in finding advocacy and support resources.      Prevention of Medical Complications    I agree to take testosterone as prescribed and to tell my doctor if I am not happy with the treatment or am experiencing any problems.    I understand that the right dose or type of medication prescribed for me may not be the same as for someone else.    I understand that physical examinations and blood tests are needed on a regular basis to check for negative side effects of testosterone.    I understand that testosterone can interact with other medications (including other sources of hormones), dietary supplements, herbs, alcohol, and street drugs. I understand that being honest with my doctor about what else I am taking will help prevent medical complications that could be life-threatening. I have been informed that I will continue to get medical care no matter what information I share, and will be kept confidential.    I understand that some medical conditions make it dangerous to take testosterone. I agree that I will be checked for risky conditions before the decision to take testosterone is made.    I understand that I can choose to stop taking testosterone at any time, and that it is advised that I do this with the help of my doctor to make sure there are no negative reactions to stopping. I understand that my doctor may suggest I reduce or stop taking testosterone if there are severe side effects or health risks that can t be controlled.      Patient Signature: ___________________________________________________________    Provider Signature: __________________________________________________________    Parent Signature (If under 18): _________________________________________________

## 2025-02-24 RX ORDER — TESTOSTERONE 1.62 MG/G
2 GEL TRANSDERMAL DAILY
Qty: 75 G | Refills: 0 | Status: SHIPPED | OUTPATIENT
Start: 2025-02-24

## 2025-03-03 ENCOUNTER — VIRTUAL VISIT (OUTPATIENT)
Facility: CLINIC | Age: 20
End: 2025-03-03
Payer: COMMERCIAL

## 2025-03-03 DIAGNOSIS — F64.2 GENDER DYSPHORIA IN CHILDHOOD: ICD-10-CM

## 2025-03-03 DIAGNOSIS — F41.1 GENERALIZED ANXIETY DISORDER: ICD-10-CM

## 2025-03-03 DIAGNOSIS — F33.1 MODERATE EPISODE OF RECURRENT MAJOR DEPRESSIVE DISORDER (H): Primary | ICD-10-CM

## 2025-03-03 PROCEDURE — 90837 PSYTX W PT 60 MINUTES: CPT | Mod: 95

## 2025-03-03 NOTE — PROGRESS NOTES
[facility]                                     Progress Note    Patient Name: Elba Adams  Date: 3/3/2025     Service Type: Individual      Session Start Time: 4:01 pm  Session End Time: 4:55 pm     Session Length: 54 min    Session #: 6    Attendees: Client attended alone    Service Modality:  Video Visit:      Provider verified identity through the following two step process.  Patient provided:  Patient address    Telemedicine Visit: The patient's condition can be safely assessed and treated via synchronous audio and visual telemedicine encounter.      Reason for Telemedicine Visit: Patient convenience (e.g. access to timely appointments / distance to available provider)    Originating Site (Patient Location): Patient's home    Distant Site (Provider Location): Provider Remote Setting- Home Office    Consent:  The patient/guardian has verbally consented to: the potential risks and benefits of telemedicine (video visit) versus in person care; bill my insurance or make self-payment for services provided; and responsibility for payment of non-covered services.     Patient would like the video invitation sent by:  My Chart    Mode of Communication:  Video Conference via Amwell    Distant Location (Provider):  Off-site    As the provider I attest to compliance with applicable laws and regulations related to telemedicine.    DATA  Interactive Complexity: No  Crisis: No  Extended Session (53+ minutes): PROLONGED SERVICE IN THE OUTPATIENT SETTING REQUIRING DIRECT (FACE-TO-FACE) PATIENT CONTACT BEYOND THE USUAL SERVICE:    - Patient's presenting concerns require more intensive intervention than could be completed within the usual service  Interactive Complexity: No  Crisis: No       Progress Since Last Session (Related to Symptoms / Goals / Homework):   Symptoms: Worsening      Homework:  partially completed      Episode of Care Goals: Minimal progress - ACTION (Actively working towards change);  "Intervened by reinforcing change plan / affirming steps taken     Current / Ongoing Stressors and Concerns:   Started testosterone, noticing emotional struggle. Fear and anxiety regarding administration. Nervous about family disapproval regarding transition   Struggle with lack of support from extended family regarding trans identity. Overwhelm with life-not sure about future, in community college, struggling with homework, attention, task completion, motivation. Lives in home with mom and her transgirlfriend, both who also have ADHD. Both work outside of the home most of the time.  Would like to have more activities as a family.   HX parents , moved around a lot, Dad had a previous marriage with a woman who self harmed, leading patient to move to [facility] in 2020. Dad disapproves of mental health supports, medication, therapy. Mom is supportive of medication, understands better.        Treatment Objective(s) Addressed in This Session:   Orientation to current stressors, areas of concern/priority, history  Sleep hygiene  Mindfulness \"Leaves on a stream\"  Mental health support resources     Intervention:   Provided active listening and validation, inquiry regarding present stressors, past influences on current functioning. Discussed re-engaging in soothing, and enjoyable activities, ie drawing. Provided psychoed on grounding skills   Acknowledged struggle with outlook/functioning starting testosterone, made a plan to discuss at PCP visit later this week.    Motivational Interviewing  Target Behavior:  seek support from mother/her partner    Stage of Change: PRECONTEMPLATION (Not seeing need for change)    MI Intervention: Expressed Empathy/Understanding     Change Talk Expressed by the Patient: Reasons to change    Provider Response to Change Talk: E - Evoked more info from patient about behavior change    Assessments completed prior to visit:  The following assessments were completed by patient for this " visit:  PHQ2:       12/11/2024    10:02 AM 5/23/2024     8:39 AM 11/28/2023     2:46 PM 11/7/2022     4:02 PM 10/7/2022     3:48 PM 5/24/2022     4:00 PM 5/10/2022     2:46 PM   PHQ-2 ( 1999 [employer])   Q1: Little interest or pleasure in doing things 3 2 2 1 2 1 2   Q2: Feeling down, depressed or hopeless 2 2 2 1 2 2 3   PHQ-2 Score 5  4 4       PHQ-2 Total Score (12-17 Years)- Positive if 3 or more points; Administer PHQ-A if positive    2 4 3 5   Q1: Little interest or pleasure in doing things Nearly every day More than half the days        Q2: Feeling down, depressed or hopeless More than half the days More than half the days        PHQ-2 Score 5 4            Patient-reported     PHQ9:       3/15/2024     2:52 PM 5/23/2024     8:39 AM 10/31/2024     9:12 AM 12/9/2024     2:44 PM 12/11/2024    10:02 AM 2/5/2025     9:14 AM 2/20/2025     7:29 PM   PHQ-9 SCORE   PHQ-9 Total Score MyChart 18 (Moderately severe depression) 16 (Moderately severe depression) 18 (Moderately severe depression) 16 (Moderately severe depression) 17 (Moderately severe depression) 15 (Moderately severe depression) 16 (Moderately severe depression)   PHQ-9 Total Score 18 16 18  16  17  15  16        Patient-reported     GAD2:       10/31/2024     9:12 AM 12/9/2024     2:44 PM 2/19/2025     3:30 PM   KRIS-2   Feeling nervous, anxious, or on edge 1 1 2   Not being able to stop or control worrying 1 1 1   KRIS-2 Total Score 2  2  3        Patient-reported     GAD7:       12/15/2021     4:02 PM 1/6/2022     5:00 PM 5/10/2022     2:46 PM 5/24/2022     4:00 PM 11/29/2023     5:00 PM 3/15/2024     2:52 PM 2/19/2025     3:30 PM   KRIS-7 SCORE   Total Score      10 (moderate anxiety) 9 (mild anxiety)   Total Score 11 12 12 5 8 10 9        Patient-reported     CAGE-AID:       3/15/2024     3:03 PM   CAGE-AID Total Score   Total Score 0   Total Score MyChart 0 (A total score of 2 or greater is considered clinically significant)     PROMIS 10-Global Health  (only subscores and total score):       10/31/2024     9:13 AM 2/19/2025     3:30 PM   PROMIS-10 Scores Only   Global Mental Health Score 5  6    Global Physical Health Score 12  13    PROMIS TOTAL - SUBSCORES 17  19        Patient-reported     [place] Suicide Severity Rating Scale (Lifetime/Recent)      5/19/2021    10:25 PM   [place] Suicide Severity Rating (Lifetime/Recent)   Q1 Wished to be Dead (Past Month) yes   Q2 Suicidal Thoughts (Past Month) yes   Q3 Suicidal Thought Method no   Q4 Suicidal Intent without Specific Plan yes   Q5 Suicide Intent with Specific Plan no   Q6 Suicide Behavior (Lifetime) no   Level of Risk per Screen high risk         ASSESSMENT: Current Emotional / Mental Status (status of significant symptoms):   Risk status (Self / Other harm or suicidal ideation)   Patient denies current fears or concerns for personal safety.   Patient reports the following current or recent suicidal ideation or behaviors: passive SI.   Patient denies current or recent homicidal ideation or behaviors.   Patient denies current or recent self injurious behavior or ideation.   Patient denies other safety concerns.   Patient reports there has been no change in risk factors since their last session.     Patient reports there has been no change in protective factors since their last session.     Recommended that patient call 911 or go to the local ED should there be a change in any of these risk factors     Appearance:   Appropriate    Eye Contact:   Good    Psychomotor Behavior: Normal    Attitude:   Cooperative    Orientation:   All   Speech    Rate / Production: Normal/ Responsive    Volume:  Soft    Mood:    Anxious  Depressed    Affect:    Appropriate    Thought Content:  Clear    Thought Form:  Coherent    Insight:    Fair      Medication Review:   No changes to current psychiatric medication(s)     Medication Compliance:   Yes       Changes in Health Issues:   None reported     Chemical Use  Review:   Substance Use: Chemical use reviewed, no active concerns identified      Tobacco Use: No current tobacco use.      Diagnosis:  1. Moderate episode of recurrent major depressive disorder (H)    2. Generalized anxiety disorder    3. Gender dysphoria in childhood        Collateral Reports Completed:   Communicated with: PCP    PLAN: (Patient Tasks / Therapist Tasks / Other)  Sleep hygiene, mindfulness, exercise (walking), grounding skills, spend time on art, do homework at school  take medication, attend appointments (use reminders) use mental health crisis resources as needed, explore trans resources, alliance, camps. Seek support from family. Attend followup T gabriela Roa [facility] 3/3/25                                                      ______________________________________________________________________                                                Individual Treatment Plan    Patient's Name: Elba Adams  YOB: 2005    Date of Creation: 2/19/25  Date Treatment Plan Last Reviewed/Revised:     DSM5 Diagnoses: 296.32 (F33.1) Major Depressive Disorder, Recurrent Episode, Moderate With anxious distress ADHD, combined type; Gender Dysphoria  Psychosocial / Contextual Factors: social anxiety, pandemic, isolation, gender   PROMIS (reviewed every 90 days):     Referral / Collaboration:  Was/were discussed and patient will pursue.  [employer] support groups, [facility]    Anticipated number of session for this episode of care:  tbd  Anticipation frequency of session:  Every 2-3 weeks  Anticipated Duration of each session: 53 or more minutes  Treatment plan will be reviewed in 90 days or when goals have been changed.     MeasurableTreatment Goal(s) related to diagnosis / functional impairment(s)  Goal 1: Patient will address mental health concerns utilizing skills, resources, values and actions    I will know I've met my goal when tbc.      Patient has not  reviewed nor agreed to the above plan.      Mildred Roa, [facility]  February 19, 2025      [place] Safety Plan      Creation Date: 3/3/25       Step 1: Warning signs:    Warning Signs    I can't take this anymore, Disrupted sleep, Appetite decrease, Disorganized/Distracted, Emotional      Step 2: Internal coping strategies - Things I can do to take my mind off my problems without contacting another person:    Strategies    Drawing, playing video games, Jazz hangouts      Step 3: People and social settings that provide distraction:    Name Contact Information    jessee Schulz        Places    [facility] trails, [place]      Step 4: People whom I can ask for help during a crisis:    Name Contact Information    Jazz Love       Step 5: Professionals or agencies I can contact during a crisis:    Clinician/Agency Name Phone Emergency Contact    Dr Laurent        Local Emergency Department Emergency Department Address Emergency Department Phone    [facility] [phone]      [facility]        Suicide Prevention Lifeline Phone: Call or Text 329  Crisis Text Line: Text HOME to 031213     Step 6: Making the environment safer (plan for lethal means safety):   No lethal means identified    Concerned about being impulsive     Optional: What is most important to me and worth living for?:   My education, pursuing a career in nursing, helping people, pets who need me, art, being there for my mother     [place] Safety Plan. Ilda Watt and Lionel Barahona. Used with permission of the authors.

## 2025-03-19 ENCOUNTER — VIRTUAL VISIT (OUTPATIENT)
Facility: CLINIC | Age: 20
End: 2025-03-19
Payer: COMMERCIAL

## 2025-03-19 DIAGNOSIS — F33.1 MODERATE EPISODE OF RECURRENT MAJOR DEPRESSIVE DISORDER (H): Primary | ICD-10-CM

## 2025-03-19 DIAGNOSIS — F90.0 ADHD (ATTENTION DEFICIT HYPERACTIVITY DISORDER), INATTENTIVE TYPE: ICD-10-CM

## 2025-03-19 PROCEDURE — 90834 PSYTX W PT 45 MINUTES: CPT | Mod: 95

## 2025-03-19 ASSESSMENT — PATIENT HEALTH QUESTIONNAIRE - PHQ9
SUM OF ALL RESPONSES TO PHQ QUESTIONS 1-9: 8
10. IF YOU CHECKED OFF ANY PROBLEMS, HOW DIFFICULT HAVE THESE PROBLEMS MADE IT FOR YOU TO DO YOUR WORK, TAKE CARE OF THINGS AT HOME, OR GET ALONG WITH OTHER PEOPLE: SOMEWHAT DIFFICULT
SUM OF ALL RESPONSES TO PHQ QUESTIONS 1-9: 8
SUM OF ALL RESPONSES TO PHQ QUESTIONS 1-9: 8
10. IF YOU CHECKED OFF ANY PROBLEMS, HOW DIFFICULT HAVE THESE PROBLEMS MADE IT FOR YOU TO DO YOUR WORK, TAKE CARE OF THINGS AT HOME, OR GET ALONG WITH OTHER PEOPLE: SOMEWHAT DIFFICULT
SUM OF ALL RESPONSES TO PHQ QUESTIONS 1-9: 8

## 2025-03-19 NOTE — PROGRESS NOTES
[facility]                                     Progress Note    Patient Name: Elba Adams  Date: 3/19/2025     Service Type: Individual      Session Start Time: 3:01 pm  Session End Time: 3:45 pm     Session Length: 44 min    Session #: 7    Attendees: Client attended alone    Service Modality:  Video Visit:      Provider verified identity through the following two step process.  Patient provided:  Patient address    Telemedicine Visit: The patient's condition can be safely assessed and treated via synchronous audio and visual telemedicine encounter.      Reason for Telemedicine Visit: Patient convenience (e.g. access to timely appointments / distance to available provider)    Originating Site (Patient Location): Patient's home    Distant Site (Provider Location): Provider Remote Setting- Home Office    Consent:  The patient/guardian has verbally consented to: the potential risks and benefits of telemedicine (video visit) versus in person care; bill my insurance or make self-payment for services provided; and responsibility for payment of non-covered services.     Patient would like the video invitation sent by:  My Chart    Mode of Communication:  Video Conference via Amwell    Distant Location (Provider):  Off-site    As the provider I attest to compliance with applicable laws and regulations related to telemedicine.    DATA  Extended Session (53+ minutes): PROLONGED SERVICE IN THE OUTPATIENT SETTING REQUIRING DIRECT (FACE-TO-FACE) PATIENT CONTACT BEYOND THE USUAL SERVICE:    - Patient's presenting concerns require more intensive intervention than could be completed within the usual service  Interactive Complexity: No  Crisis: No       Progress Since Last Session (Related to Symptoms / Goals / Homework):   Symptoms: Improving mood-outlook    Homework:  partially completed      Episode of Care Goals: Minimal progress - ACTION (Actively working towards change); Intervened by reinforcing  "change plan / affirming steps taken     Current / Ongoing Stressors and Concerns:   Started testosterone, noticing emotional struggle. Fear and anxiety regarding administration. Nervous about family disapproval regarding transition   Struggle with lack of support from extended family regarding trans identity. Overwhelm with life-not sure about future, in community college, struggling with homework, attention, task completion, motivation. Lives in home with mom and her transgirlfriend, both who also have ADHD. Both work outside of the home most of the time.  Would like to have more activities as a family.   HX parents , moved around a lot, Dad had a previous marriage with a woman who self harmed, leading patient to move to [facility] in 2020. Dad disapproves of mental health supports, medication, therapy. Mom is supportive of medication, understands better.        Treatment Objective(s) Addressed in This Session:   Orientation to current stressors, areas of concern/priority, history  Sleep hygiene  Mindfulness \"Leaves on a stream\"  Mental health support resources     Intervention:   Provided active listening and validation, inquiry regarding present stressors, past influences on current functioning. Discussed re-engaging in soothing, and enjoyable activities, ie drawing. Provided psychoed on grounding skills   Acknowledged improvement in mood/outlook. Celebrated success with solo vet visit, effort to practice connecting online, IRL, going places alone  Motivational Interviewing  Target Behavior:  seek support from mother/her partner  engage in trans resources (discord )    Stage of Change: ACTION (Actively working towards change)    MI Intervention: Expressed Empathy/Understanding and Open-ended questions     Change Talk Expressed by the Patient: Ability to change Reasons to change    Provider Response to Change Talk: E - Evoked more info from patient about behavior change    Assessments completed prior to " visit:  The following assessments were completed by patient for this visit:  PHQ2:       12/11/2024    10:02 AM 5/23/2024     8:39 AM 11/28/2023     2:46 PM 11/7/2022     4:02 PM 10/7/2022     3:48 PM 5/24/2022     4:00 PM 5/10/2022     2:46 PM   PHQ-2 ( 1999 [employer])   Q1: Little interest or pleasure in doing things 3 2 2 1 2 1 2   Q2: Feeling down, depressed or hopeless 2 2 2 1 2 2 3   PHQ-2 Score 5  4 4       PHQ-2 Total Score (12-17 Years)- Positive if 3 or more points; Administer PHQ-A if positive    2 4 3 5   Q1: Little interest or pleasure in doing things Nearly every day More than half the days        Q2: Feeling down, depressed or hopeless More than half the days More than half the days        PHQ-2 Score 5 4            Patient-reported     PHQ9:       5/23/2024     8:39 AM 10/31/2024     9:12 AM 12/9/2024     2:44 PM 12/11/2024    10:02 AM 2/5/2025     9:14 AM 2/20/2025     7:29 PM 3/19/2025     2:43 PM   PHQ-9 SCORE   PHQ-9 Total Score MyChart 16 (Moderately severe depression) 18 (Moderately severe depression) 16 (Moderately severe depression) 17 (Moderately severe depression) 15 (Moderately severe depression) 16 (Moderately severe depression) 8 (Mild depression)   PHQ-9 Total Score 16 18  16  17  15  16  8        Patient-reported     GAD2:       10/31/2024     9:12 AM 12/9/2024     2:44 PM 2/19/2025     3:30 PM   KRIS-2   Feeling nervous, anxious, or on edge 1 1 2   Not being able to stop or control worrying 1 1 1   KRIS-2 Total Score 2  2  3        Patient-reported     GAD7:       12/15/2021     4:02 PM 1/6/2022     5:00 PM 5/10/2022     2:46 PM 5/24/2022     4:00 PM 11/29/2023     5:00 PM 3/15/2024     2:52 PM 2/19/2025     3:30 PM   KRIS-7 SCORE   Total Score      10 (moderate anxiety) 9 (mild anxiety)   Total Score 11 12 12 5 8 10 9        Patient-reported     CAGE-AID:       3/15/2024     3:03 PM   CAGE-AID Total Score   Total Score 0   Total Score MyChart 0 (A total score of 2 or greater is considered  clinically significant)     PROMIS 10-Global Health (only subscores and total score):       10/31/2024     9:13 AM 2/19/2025     3:30 PM   PROMIS-10 Scores Only   Global Mental Health Score 5  6    Global Physical Health Score 12  13    PROMIS TOTAL - SUBSCORES 17  19        Patient-reported     [place] Suicide Severity Rating Scale (Lifetime/Recent)      5/19/2021    10:25 PM   [place] Suicide Severity Rating (Lifetime/Recent)   Q1 Wished to be Dead (Past Month) yes   Q2 Suicidal Thoughts (Past Month) yes   Q3 Suicidal Thought Method no   Q4 Suicidal Intent without Specific Plan yes   Q5 Suicide Intent with Specific Plan no   Q6 Suicide Behavior (Lifetime) no   Level of Risk per Screen high risk         ASSESSMENT: Current Emotional / Mental Status (status of significant symptoms):   Risk status (Self / Other harm or suicidal ideation)   Patient denies current fears or concerns for personal safety.   Patient denies current or recent suicidal ideation or behaviors.   Patient denies current or recent homicidal ideation or behaviors.   Patient denies current or recent self injurious behavior or ideation.   Patient denies other safety concerns.   Patient reports there has been no change in risk factors since their last session.     Patient reports there has been no change in protective factors since their last session.     A safety and risk management plan has been developed including: Patient consented to co-developed safety plan on 3/3/25.  Safety and risk management plan was reviewed.   Patient agreed to use safety plan should any safety concerns arise.  A copy was made available to the patient.     Appearance:   Appropriate    Eye Contact:   Good    Psychomotor Behavior: Normal    Attitude:   Cooperative  Pleasant   Orientation:   All   Speech    Rate / Production: Normal/ Responsive    Volume:  Soft    Mood:    Anxious  Depressed    Affect:    Appropriate    Thought Content:  Clear    Thought Form:  Coherent     Insight:    Fair      Medication Review:   No changes to current psychiatric medication(s)     Medication Compliance:   Yes       Changes in Health Issues:   None reported     Chemical Use Review:   Substance Use: Chemical use reviewed, no active concerns identified      Tobacco Use: No current tobacco use.      Diagnosis:  1. Moderate episode of recurrent major depressive disorder (H)    2. ADHD (attention deficit hyperactivity disorder), inattentive type        Collateral Reports Completed:   Communicated with: PCP    PLAN: (Patient Tasks / Therapist Tasks / Other)  Sleep hygiene, mindfulness, exercise (walking), grounding skills, spend time on art, do homework at school  take medication, attend appointments (use reminders) use mental health crisis resources as needed, explore trans resources, alliance, camps. Seek support from family. Attend followup T appts. Use safety plan. Use exposure work to practice communicating (online, IRL), getting out in the community    MORRIS Moreno 3/19/25                                                      ______________________________________________________________________                                                Individual Treatment Plan    Patient's Name: Elba Adams  YOB: 2005    Date of Creation: 2/19/25  Date Treatment Plan Last Reviewed/Revised:     DSM5 Diagnoses: 296.32 (F33.1) Major Depressive Disorder, Recurrent Episode, Moderate With anxious distress ADHD, combined type; Gender Dysphoria  Psychosocial / Contextual Factors: social anxiety, pandemic, isolation, gender   PROMIS (reviewed every 90 days):     Referral / Collaboration:  Was/were discussed and patient will pursue.  [employer] support groups, [facility]    Anticipated number of session for this episode of care:  tbd  Anticipation frequency of session:  Every 2-3 weeks  Anticipated Duration of each session: 53 or more minutes  Treatment plan will be  reviewed in 90 days or when goals have been changed.     MeasurableTreatment Goal(s) related to diagnosis / functional impairment(s)  Goal 1: Patient will address mental health concerns utilizing skills, resources, values and actions    I will know I've met my goal when tbc.      Patient has not reviewed nor agreed to the above plan.      Mildred Roa, [facility]  February 19, 2025      [place] Safety Plan      Creation Date: 3/3/25       Step 1: Warning signs:    Warning Signs    I can't take this anymore, Disrupted sleep, Appetite decrease, Disorganized/Distracted, Emotional      Step 2: Internal coping strategies - Things I can do to take my mind off my problems without contacting another person:    Strategies    Drawing, playing video games, Jazz hangouts      Step 3: People and social settings that provide distraction:    Name Contact Information    jessee Schulz        Places    [facility] trails, [place]      Step 4: People whom I can ask for help during a crisis:    Name Contact Information    Jazz Love       Step 5: Professionals or agencies I can contact during a crisis:    Clinician/Agency Name Phone Emergency Contact    Dr Laurent        [facility] Emergency Department Emergency Department Address Emergency Department Phone    [facility] [phone]      [facility]        Suicide Prevention Lifeline Phone: Call or Text 385  Crisis Text Line: Text HOME to 081636     Step 6: Making the environment safer (plan for lethal means safety):   No lethal means identified    Concerned about being impulsive     Optional: What is most important to me and worth living for?:   My education, pursuing a career in nursing, helping people, pets who need me, art, being there for my mother     [place] Safety Plan. Ilda Watt and Lionel Barahona. Used with permission of the authors.

## 2025-03-25 ENCOUNTER — OFFICE VISIT (OUTPATIENT)
Dept: FAMILY MEDICINE | Facility: CLINIC | Age: 20
End: 2025-03-25
Payer: COMMERCIAL

## 2025-03-25 VITALS
OXYGEN SATURATION: 96 % | HEIGHT: 65 IN | HEART RATE: 103 BPM | TEMPERATURE: 97.8 F | SYSTOLIC BLOOD PRESSURE: 132 MMHG | BODY MASS INDEX: 33.61 KG/M2 | DIASTOLIC BLOOD PRESSURE: 86 MMHG | RESPIRATION RATE: 20 BRPM

## 2025-03-25 DIAGNOSIS — F41.1 GENERALIZED ANXIETY DISORDER: ICD-10-CM

## 2025-03-25 DIAGNOSIS — R00.0 TACHYCARDIA: ICD-10-CM

## 2025-03-25 DIAGNOSIS — F64.0 GENDER DYSPHORIA IN ADULT: ICD-10-CM

## 2025-03-25 DIAGNOSIS — F90.0 ADHD (ATTENTION DEFICIT HYPERACTIVITY DISORDER), INATTENTIVE TYPE: ICD-10-CM

## 2025-03-25 DIAGNOSIS — F33.2 SEVERE EPISODE OF RECURRENT MAJOR DEPRESSIVE DISORDER, WITHOUT PSYCHOTIC FEATURES (H): ICD-10-CM

## 2025-03-25 DIAGNOSIS — F33.1 MODERATE EPISODE OF RECURRENT MAJOR DEPRESSIVE DISORDER (H): ICD-10-CM

## 2025-03-25 DIAGNOSIS — F40.10 SOCIAL ANXIETY DISORDER: ICD-10-CM

## 2025-03-25 DIAGNOSIS — R39.14 FEELING OF INCOMPLETE BLADDER EMPTYING: Primary | ICD-10-CM

## 2025-03-25 LAB
ALBUMIN UR-MCNC: NEGATIVE MG/DL
APPEARANCE UR: CLEAR
BACTERIA #/AREA URNS HPF: ABNORMAL /HPF
BILIRUB UR QL STRIP: NEGATIVE
COLOR UR AUTO: YELLOW
GLUCOSE UR STRIP-MCNC: NEGATIVE MG/DL
HGB UR QL STRIP: ABNORMAL
KETONES UR STRIP-MCNC: NEGATIVE MG/DL
LEUKOCYTE ESTERASE UR QL STRIP: NEGATIVE
NITRATE UR QL: NEGATIVE
PH UR STRIP: 7.5 [PH] (ref 5–8)
RBC #/AREA URNS AUTO: ABNORMAL /HPF
SP GR UR STRIP: 1.02 (ref 1–1.03)
SQUAMOUS #/AREA URNS AUTO: ABNORMAL /LPF
UROBILINOGEN UR STRIP-ACNC: 0.2 E.U./DL
WBC #/AREA URNS AUTO: ABNORMAL /HPF

## 2025-03-25 RX ORDER — METHYLPHENIDATE HYDROCHLORIDE 54 MG/1
54 TABLET ORAL DAILY
Qty: 30 TABLET | Refills: 0 | Status: CANCELLED | OUTPATIENT
Start: 2025-03-25

## 2025-03-25 ASSESSMENT — ANXIETY QUESTIONNAIRES
7. FEELING AFRAID AS IF SOMETHING AWFUL MIGHT HAPPEN: SEVERAL DAYS
4. TROUBLE RELAXING: SEVERAL DAYS
2. NOT BEING ABLE TO STOP OR CONTROL WORRYING: SEVERAL DAYS
GAD7 TOTAL SCORE: 8
3. WORRYING TOO MUCH ABOUT DIFFERENT THINGS: SEVERAL DAYS
5. BEING SO RESTLESS THAT IT IS HARD TO SIT STILL: SEVERAL DAYS
1. FEELING NERVOUS, ANXIOUS, OR ON EDGE: SEVERAL DAYS
GAD7 TOTAL SCORE: 8
IF YOU CHECKED OFF ANY PROBLEMS ON THIS QUESTIONNAIRE, HOW DIFFICULT HAVE THESE PROBLEMS MADE IT FOR YOU TO DO YOUR WORK, TAKE CARE OF THINGS AT HOME, OR GET ALONG WITH OTHER PEOPLE: NOT DIFFICULT AT ALL
6. BECOMING EASILY ANNOYED OR IRRITABLE: MORE THAN HALF THE DAYS
GAD7 TOTAL SCORE: 8
7. FEELING AFRAID AS IF SOMETHING AWFUL MIGHT HAPPEN: SEVERAL DAYS
8. IF YOU CHECKED OFF ANY PROBLEMS, HOW DIFFICULT HAVE THESE MADE IT FOR YOU TO DO YOUR WORK, TAKE CARE OF THINGS AT HOME, OR GET ALONG WITH OTHER PEOPLE?: NOT DIFFICULT AT ALL

## 2025-03-25 NOTE — PROGRESS NOTES
Assessment & Plan     Feeling of incomplete bladder emptying  Acute, no red flags on hx or exam. May be tissue irritation from vaginal dryness. Advised on return precautions, will obtain UA r/o infection. Hx of microscopic hematuria 2 years ago. No hx of UTI. Advised hygiene with self pleasure tools and using replens/lubrication given patient has started on androgel.   - UA Macroscopic with reflex to Microscopic and Culture; Future  - UA Macroscopic with reflex to Microscopic and Culture  - UA Microscopic with Reflex to Culture    Tachycardia  Elevated BP without diagnosis of hypertension  Asymptomatic. Office HR , BP also in stage I HTN range. suspect stimulant effect from concerta and wellbutrin.  Will obtain EKG and TSH at next visit.     Gender dysphoria in adult  Started on Androgel 1 month ago, initially had worsening mental health but stabilized. Has been using consistently over the past month and looking forward to changes. Dicussed decrease to 1 pump if mood symptoms      Generalized anxiety disorder  Severe episode of recurrent major depressive disorder, without psychotic features (H)  Social anxiety disorder  ADHD (attention deficit hyperactivity disorder), inattentive type  Moderate episode of recurrent major depressive disorder (H)  Follows with [facility] for therapy. Referral for Primary care psychiatry was placed several visits ago, advised patient to make appt with Dr. Gomez.The patient's mental health appears to fluctuate with each visit. At times, they report feeling that their mental health is well-managed and under control, while at other times, they express concerns about the need for medication adjustmentsSeeking psychiatry eval for diagnostic clarity on primary diagnosis and co-management of medications.         The longitudinal plan of care for the diagnosis(es)/condition(s) as documented were addressed during this visit. Due to the added complexity in care, I will continue  "to support Shen in the subsequent management and with ongoing continuity of care.    Return in about 4 weeks (around 4/22/2025).    Subjective   Shen is a 19 year old, presenting for the following health issues:  RECHECK (hrt) and UTI        3/25/2025     2:48 PM   Additional Questions   Roomed by Doua   Accompanied by Self         3/25/2025   Declines Weight   Did patient decline having their weight taken? Yes         3/25/2025     2:48 PM   Patient Reported Additional Medications   Patient reports taking the following new medications n/a         3/25/2025    Information    services provided? No     HRT   - Going well  - Initially had some worsening mental health > increased SI and feelings of depression - lasted the first week, does not feel it might have been related to other stressors  - Denies active and passive SI   - Has therapy, feels that it improved after his therapy appointment  - Currently tolerating androgel well, reports good compliance       Urinary Concerns   - 2 day hx, getting a little worse   - A little vaginal dryness since starting T  - Incomplete emptying, some spotting, external area irritation  - Denies dysuria  - No sexual partners   - Has menses, couple weeks ago.   - No pelvic pain.   - Denies hematuria, no blood clots in the urine   - New new exercise, no recent biking   - Trying new vibrators > encouraged hygiene and lubrication          Review of Systems  Constitutional, HEENT, cardiovascular, pulmonary, GI, , musculoskeletal, neuro, skin, endocrine and psych systems are negative, except as otherwise noted.      Objective    /86   Pulse 103   Temp 97.8  F (36.6  C) (Temporal)   Resp 20   Ht 1.651 m (5' 5\")   LMP 01/20/2025 (Approximate)   SpO2 96%   BMI 33.61 kg/m    Body mass index is 33.61 kg/m .      Physical Exam  Vitals and nursing note reviewed.   Constitutional:       General: He is not in acute distress.     Appearance: Normal appearance. " He is not ill-appearing.   HENT:      Head: Normocephalic and atraumatic.   Eyes:      Conjunctiva/sclera: Conjunctivae normal.   Cardiovascular:      Rate and Rhythm: Regular rhythm. Tachycardia present.   Pulmonary:      Effort: Pulmonary effort is normal.      Breath sounds: Normal breath sounds.   Abdominal:      Palpations: Abdomen is soft.      Tenderness: There is no abdominal tenderness. There is no right CVA tenderness or left CVA tenderness.   Skin:     General: Skin is warm.      Capillary Refill: Capillary refill takes less than 2 seconds.   Neurological:      General: No focal deficit present.      Mental Status: He is alert.   Psychiatric:         Mood and Affect: Mood normal.         Signed Electronically by: Caty Laurent DO    Answers submitted by the patient for this visit:  Patient Health Questionnaire (Submitted on 3/19/2025)  If you checked off any problems, how difficult have these problems made it for you to do your work, take care of things at home, or get along with other people?: Somewhat difficult  PHQ9 TOTAL SCORE: 8  Patient Health Questionnaire (G7) (Submitted on 3/25/2025)  KRIS 7 TOTAL SCORE: 8

## 2025-03-26 NOTE — PATIENT INSTRUCTIONS
Patient Education   Here is the plan from today's visit    1. Feeling of incomplete bladder emptying (Primary)    - UA Macroscopic with reflex to Microscopic and Culture; Future  - UA Macroscopic with reflex to Microscopic and Culture  - UA Microscopic with Reflex to Culture    2. Tachycardia      3. Gender dysphoria in adult      4. Generalized anxiety disorder      5. Severe episode of recurrent major depressive disorder, without psychotic features (H)      6. Social anxiety disorder    7. ADHD (attention deficit hyperactivity disorder), inattentive type      8. Moderate episode of recurrent major depressive disorder (H)            Please call or return to clinic if your symptoms don't go away.    Follow up plan  No follow-ups on file.    Thank you for coming to [facility] today.  Lab Testing:  **If you had lab testing today and your results are reassuring or normal they will be mailed to you or sent through [employer] within 7 days.   **If the lab tests need quick action we will call you with the results.  **If you are having labs done on a different day, please call [phone] to schedule at [facility] or [phone] for other [facility] locations. Labs do not offer walk-in appointments.  The phone number we will call with results is # [phone] (home) . If this is not the best number please call our clinic and change the number.  Medication Refills:  If you need any refills please call your pharmacy and they will contact us.   If you need to  your refill at a new pharmacy, please contact the new pharmacy directly. The new pharmacy will help you get your medications transferred faster.   Scheduling:  If you have any concerns about today's visit or wish to schedule another appointment please call our office during normal business hours [phone] (8-5:00 M-F). If you can no longer make a scheduled visit, please cancel via [employer] or call us to cancel.   If a referral was  made to an [facility] specialty provider and you do not get a call from central scheduling, please refer to directions on your visit summary or call our office during normal business hours for assistance.   If a Mammogram was ordered for you at the Breast Center call [phone] to schedule or change your appointment.  If you had an XRay/CT/Ultrasound/MRI ordered the number is [phone] to schedule or change your radiology appointment.   [facility] has limited ultrasound appointments available on Wednesdays, if you would like your ultrasound at [facility], please call [phone] to schedule.   Medical Concerns:  If you have urgent medical concerns please call [phone] at any time of the day.    Caty Laurent, DO

## 2025-03-31 ENCOUNTER — MYC REFILL (OUTPATIENT)
Dept: FAMILY MEDICINE | Facility: CLINIC | Age: 20
End: 2025-03-31
Payer: COMMERCIAL

## 2025-03-31 DIAGNOSIS — F64.0 GENDER DYSPHORIA IN ADULT: ICD-10-CM

## 2025-04-01 NOTE — TELEPHONE ENCOUNTER
"Request for medication refill:    Medication Name:     testosterone (ANDROGEL 1.62 % PUMP) 20.25 MG/ACT gel       Providers if patient needs an appointment and you are willing to give a one month supply please refill for one month and  send a MyChart using \".SMILLIMITEDREFILL\" .Or route chart to \"P [facility] \" . To call patient and inform of limited refill and providers request to make an appointment. (Giving one month refill in non controlled medications is strongly recommended before denial)    If refill has been denied, meaning absolutely no refills without visit, please complete the smart phrase \".SMIRXREFUSE\" and route it to the \"P [facility] MED REFILLS\"  pool to inform the patient and the pharmacy.    Jhon Weaver, CMA    "

## 2025-04-02 RX ORDER — TESTOSTERONE 1.62 MG/G
2 GEL TRANSDERMAL DAILY
Qty: 75 G | Refills: 0 | Status: SHIPPED | OUTPATIENT
Start: 2025-04-02

## 2025-04-16 ENCOUNTER — VIRTUAL VISIT (OUTPATIENT)
Facility: CLINIC | Age: 20
End: 2025-04-16
Payer: COMMERCIAL

## 2025-04-16 DIAGNOSIS — F90.0 ADHD (ATTENTION DEFICIT HYPERACTIVITY DISORDER), INATTENTIVE TYPE: ICD-10-CM

## 2025-04-16 DIAGNOSIS — F33.1 MODERATE EPISODE OF RECURRENT MAJOR DEPRESSIVE DISORDER (H): ICD-10-CM

## 2025-04-16 DIAGNOSIS — G47.09 OTHER INSOMNIA: ICD-10-CM

## 2025-04-16 DIAGNOSIS — F41.1 GENERALIZED ANXIETY DISORDER: Primary | ICD-10-CM

## 2025-04-16 PROCEDURE — 90837 PSYTX W PT 60 MINUTES: CPT | Mod: 95

## 2025-04-16 NOTE — TELEPHONE ENCOUNTER
"Request for medication refill:    Medication Name: mirtazapine (REMERON) 7.5 MG tablet     Providers if patient needs an appointment and you are willing to give a one month supply please refill for one month and  send a MyChart using \".SMILLIMITEDREFILL\" .Or route chart to \"P [facility] \" . To call patient and inform of limited refill and providers request to make an appointment. (Giving one month refill in non controlled medications is strongly recommended before denial)    If refill has been denied, meaning absolutely no refills without visit, please complete the smart phrase \".SMIRXREFUSE\" and route it to the \"P [facility] MED REFILLS\"  pool to inform the patient and the pharmacy.    Roula Mcdaniel MA      "

## 2025-04-16 NOTE — PROGRESS NOTES
[facility]                                     Progress Note    Patient Name: Elba Adams  Date: 4/16/2025     Service Type: Individual      Session Start Time: 3:04 pm  Session End Time: 3:57 pm     Session Length: 53 min    Session #: 8    Attendees: Client attended alone    Service Modality:  Video Visit:      Provider verified identity through the following two step process.  Patient provided:  Patient address    Telemedicine Visit: The patient's condition can be safely assessed and treated via synchronous audio and visual telemedicine encounter.      Reason for Telemedicine Visit: Patient convenience (e.g. access to timely appointments / distance to available provider)    Originating Site (Patient Location): Patient's home    Distant Site (Provider Location): Provider Remote Setting- Home Office    Consent:  The patient/guardian has verbally consented to: the potential risks and benefits of telemedicine (video visit) versus in person care; bill my insurance or make self-payment for services provided; and responsibility for payment of non-covered services.     Patient would like the video invitation sent by:  My Chart    Mode of Communication:  Video Conference via [employer]    Distant Location (Provider):  Off-site    As the provider I attest to compliance with applicable laws and regulations related to telemedicine.    DATA  Extended Session (53+ minutes): PROLONGED SERVICE IN THE OUTPATIENT SETTING REQUIRING DIRECT (FACE-TO-FACE) PATIENT CONTACT BEYOND THE USUAL SERVICE:    - Patient's presenting concerns require more intensive intervention than could be completed within the usual service  Interactive Complexity: No  Crisis: No       Progress Since Last Session (Related to Symptoms / Goals / Homework):   Symptoms: Improving mood-outlook    Homework:  partially completed      Episode of Care Goals: Minimal progress - ACTION (Actively working towards change); Intervened by reinforcing  "change plan / affirming steps taken     Current / Ongoing Stressors and Concerns:   Starting a heart monitor to address elevated heart rate at last medical appt. Started testosterone, noticing emotional struggle. Fear and anxiety regarding administration. Struggle with lack of support from extended family regarding trans identity. Overwhelm with life-not sure about future, in community college, struggling with homework, attention, task completion, motivation. Lives in home with mom and her transgirlfriend, both who also have ADHD. Both work outside of the home most of the time.  Would like to have more activities as a family.   HX parents , moved around a lot, Dad had a previous marriage with a woman who self harmed, leading patient to move to [facility] in 2020. Dad disapproves of mental health supports, medication, therapy. Mom is supportive of medication, understands better.        Treatment Objective(s) Addressed in This Session:   Orientation to current stressors, areas of concern/priority, history  Sleep hygiene  Mindfulness \"Leaves on a stream\"  Mental health support resources     Intervention:   Provided active listening and validation, inquiry regarding present stressors, past influences on current functioning. Discussed re-engaging in soothing, and enjoyable activities, ie drawing. Provided psychoed on grounding skills   Acknowledged improvement in mood/outlook. Surfaced desire to learn how to bike for commuting, exercise. Introduced strengths identification. Assigned homework. Celebrated efforts toward school.   Motivational Interviewing  Target Behavior:  seek support from mother/her partner  engage in trans resources (discord ), learn to ride a bike-use a bike to     Stage of Change: ACTION (Actively working towards change)   preparation (bike)    MI Intervention: Expressed Empathy/Understanding and Open-ended questions     Change Talk Expressed by the Patient: Ability to change Reasons to " change    Provider Response to Change Talk: E - Evoked more info from patient about behavior change    Assessments completed prior to visit:  The following assessments were completed by patient for this visit:  PHQ2:       12/11/2024    10:02 AM 5/23/2024     8:39 AM 11/28/2023     2:46 PM 11/7/2022     4:02 PM 10/7/2022     3:48 PM 5/24/2022     4:00 PM 5/10/2022     2:46 PM   PHQ-2 ( 1999 [employer])   Q1: Little interest or pleasure in doing things 3 2 2 1 2 1 2   Q2: Feeling down, depressed or hopeless 2 2 2 1 2 2 3   PHQ-2 Score 5  4 4       PHQ-2 Total Score (12-17 Years)- Positive if 3 or more points; Administer PHQ-A if positive    2 4 3 5   Q1: Little interest or pleasure in doing things Nearly every day More than half the days        Q2: Feeling down, depressed or hopeless More than half the days More than half the days        PHQ-2 Score 5 4            Patient-reported     PHQ9:       10/31/2024     9:12 AM 12/9/2024     2:44 PM 12/11/2024    10:02 AM 2/5/2025     9:14 AM 2/20/2025     7:29 PM 3/19/2025     2:43 PM 4/14/2025     2:01 PM   PHQ-9 SCORE   PHQ-9 Total Score MyChart 18 (Moderately severe depression) 16 (Moderately severe depression) 17 (Moderately severe depression) 15 (Moderately severe depression) 16 (Moderately severe depression) 8 (Mild depression) 9 (Mild depression)   PHQ-9 Total Score 18  16  17  15  16  8  9        Patient-reported     GAD2:       10/31/2024     9:12 AM 12/9/2024     2:44 PM 2/19/2025     3:30 PM 4/16/2025     2:43 PM   KRIS-2   Feeling nervous, anxious, or on edge 1 1 2 1   Not being able to stop or control worrying 1 1 1 0   KRIS-2 Total Score 2  2  3  1        Patient-reported     GAD7:       5/10/2022     2:46 PM 5/24/2022     4:00 PM 11/29/2023     5:00 PM 3/15/2024     2:52 PM 2/19/2025     3:30 PM 3/25/2025     2:40 PM 4/14/2025     2:02 PM   KRIS-7 SCORE   Total Score    10 (moderate anxiety) 9 (mild anxiety) 8 (mild anxiety) 4 (minimal anxiety)   Total Score 12 5 8  10 9  8  4        Patient-reported     CAGE-AID:       3/15/2024     3:03 PM   CAGE-AID Total Score   Total Score 0   Total Score MyChart 0 (A total score of 2 or greater is considered clinically significant)     PROMIS 10-Global Health (only subscores and total score):       10/31/2024     9:13 AM 2/19/2025     3:30 PM   PROMIS-10 Scores Only   Global Mental Health Score 5  6    Global Physical Health Score 12  13    PROMIS TOTAL - SUBSCORES 17  19        Patient-reported     [place] Suicide Severity Rating Scale (Lifetime/Recent)      5/19/2021    10:25 PM   [place] Suicide Severity Rating (Lifetime/Recent)   Q1 Wished to be Dead (Past Month) yes   Q2 Suicidal Thoughts (Past Month) yes   Q3 Suicidal Thought Method no   Q4 Suicidal Intent without Specific Plan yes   Q5 Suicide Intent with Specific Plan no   Q6 Suicide Behavior (Lifetime) no   Level of Risk per Screen high risk         ASSESSMENT: Current Emotional / Mental Status (status of significant symptoms):   Risk status (Self / Other harm or suicidal ideation)   Patient denies current fears or concerns for personal safety.   Patient denies current or recent suicidal ideation or behaviors.   Patient denies current or recent homicidal ideation or behaviors.   Patient denies current or recent self injurious behavior or ideation.   Patient denies other safety concerns.   Patient reports there has been no change in risk factors since their last session.     Patient reports there has been no change in protective factors since their last session.     A safety and risk management plan has been developed including: Patient consented to co-developed safety plan on 3/3/25.  Safety and risk management plan was reviewed.   Patient agreed to use safety plan should any safety concerns arise.  A copy was made available to the patient.     Appearance:   Appropriate    Eye Contact:   Good    Psychomotor Behavior: Normal    Attitude:   Cooperative   Pleasant   Orientation:   All   Speech    Rate / Production: Normal/ Responsive    Volume:  Soft    Mood:    Anxious  Depressed    Affect:    Blunted  Tearful   Thought Content:  Clear    Thought Form:  Coherent    Insight:    Fair      Medication Review:   No changes to current psychiatric medication(s)     Medication Compliance:   Yes       Changes in Health Issues:   None reported     Chemical Use Review:   Substance Use: Chemical use reviewed, no active concerns identified      Tobacco Use: No current tobacco use.      Diagnosis:  1. Generalized anxiety disorder    2. ADHD (attention deficit hyperactivity disorder), inattentive type    3. Moderate episode of recurrent major depressive disorder (H)        Collateral Reports Completed:   Communicated with: PCP    PLAN: (Patient Tasks / Therapist Tasks / Other)  Sleep hygiene, mindfulness, exercise (walking), grounding skills, spend time on art, do homework at school  take medication, attend appointments (use reminders) use mental health crisis resources/safety plan. Seek support from family. Attend followup T appts. Return to practice communicating (online, IRL), getting out in the community. Pursue bike practice, complete strengths identification    Mildred Roa, [facility] 4/16/25                                                      ______________________________________________________________________                                                Individual Treatment Plan    Patient's Name: Elba Adams  YOB: 2005    Date of Creation: 2/19/25  Date Treatment Plan Last Reviewed/Revised:     DSM5 Diagnoses: 296.32 (F33.1) Major Depressive Disorder, Recurrent Episode, Moderate With anxious distress ADHD, combined type; Gender Dysphoria  Psychosocial / Contextual Factors: social anxiety, pandemic, isolation, gender   PROMIS (reviewed every 90 days):     Referral / Collaboration:  Was/were discussed and patient will pursue.  [employer] support  groups, summer [place]    Anticipated number of session for this episode of care:  tbd  Anticipation frequency of session:  Every 2-3 weeks  Anticipated Duration of each session: 53 or more minutes  Treatment plan will be reviewed in 90 days or when goals have been changed.     MeasurableTreatment Goal(s) related to diagnosis / functional impairment(s)  Goal 1: Patient will address mental health concerns utilizing skills, resources, values and actions    I will know I've met my goal when tbc.      Patient has not reviewed nor agreed to the above plan.      Mildred Rao [facility]  February 19, 2025      [place] Safety Plan      Creation Date: 3/3/25       Step 1: Warning signs:    Warning Signs    I can't take this anymore, Disrupted sleep, Appetite decrease, Disorganized/Distracted, Emotional      Step 2: Internal coping strategies - Things I can do to take my mind off my problems without contacting another person:    Strategies    Drawing, playing video games, Jazz hangouts      Step 3: People and social settings that provide distraction:    Name Contact Information    jessee Schulz        Places    [facility] trails, [place]      Step 4: People whom I can ask for help during a crisis:    Name Contact Information    Jazz Love       Step 5: Professionals or agencies I can contact during a crisis:    Clinician/Agency Name Phone Emergency Contact    Dr Laurent        Local Emergency Department Emergency Department Address Emergency Department Phone    [facility] [phone]      [facility]        Suicide Prevention Lifeline Phone: Call or Text 126  Crisis Text Line: Text HOME to 744838     Step 6: Making the environment safer (plan for lethal means safety):   No lethal means identified    Concerned about being impulsive     Optional: What is most important to me and worth living for?:   My education, pursuing a career in nursing, helping people, pets who need me,  art, being there for my mother     [place] Safety Plan. Ilda Watt and Lionel Barahona. Used with permission of the authors.

## 2025-04-17 RX ORDER — MIRTAZAPINE 7.5 MG/1
7.5 TABLET, FILM COATED ORAL AT BEDTIME
Qty: 90 TABLET | Refills: 0 | Status: SHIPPED | OUTPATIENT
Start: 2025-04-17

## 2025-04-27 ENCOUNTER — HEALTH MAINTENANCE LETTER (OUTPATIENT)
Age: 20
End: 2025-04-27

## 2025-05-09 ENCOUNTER — MYC REFILL (OUTPATIENT)
Dept: FAMILY MEDICINE | Facility: CLINIC | Age: 20
End: 2025-05-09
Payer: COMMERCIAL

## 2025-05-09 DIAGNOSIS — F90.0 ADHD (ATTENTION DEFICIT HYPERACTIVITY DISORDER), INATTENTIVE TYPE: ICD-10-CM

## 2025-05-09 DIAGNOSIS — F64.0 GENDER DYSPHORIA IN ADULT: ICD-10-CM

## 2025-05-09 DIAGNOSIS — J30.89 SEASONAL ALLERGIC RHINITIS DUE TO OTHER ALLERGIC TRIGGER: ICD-10-CM

## 2025-05-09 DIAGNOSIS — J45.20 MILD INTERMITTENT ASTHMA WITHOUT COMPLICATION: ICD-10-CM

## 2025-05-09 NOTE — TELEPHONE ENCOUNTER
"Request for medication refill:    Medication Name:     testosterone (ANDROGEL 1.62 % PUMP) 20.25 MG/ACT gel     methylphenidate HCl ER, OSM, (CONCERTA) 54 MG CR tablet     Providers if patient needs an appointment and you are willing to give a one month supply please refill for one month and  send a MyChart using \".SMILLIMITEDREFILL\" .Or route chart to \"P [facility] \" . To call patient and inform of limited refill and providers request to make an appointment. (Giving one month refill in non controlled medications is strongly recommended before denial)    If refill has been denied, meaning absolutely no refills without visit, please complete the smart phrase \".SMIRXREFUSE\" and route it to the \"P [facility] MED REFILLS\"  pool to inform the patient and the pharmacy.    Maico Lau MA     "

## 2025-05-09 NOTE — TELEPHONE ENCOUNTER
"Request for medication refill:    Medication Name:   loratadine (CLARITIN) 10 MG tablet     Providers if patient needs an appointment and you are willing to give a one month supply please refill for one month and  send a MyChart using \".SMILLIMITEDREFILL\" .Or route chart to \"P [facility] \" . To call patient and inform of limited refill and providers request to make an appointment. (Giving one month refill in non controlled medications is strongly recommended before denial)    If refill has been denied, meaning absolutely no refills without visit, please complete the smart phrase \".SMIRXREFUSE\" and route it to the \"P [facility] MED REFILLS\"  pool to inform the patient and the pharmacy.    Maico Lau MA     "

## 2025-05-12 RX ORDER — LORATADINE 10 MG/1
10 TABLET ORAL DAILY
Qty: 90 TABLET | Refills: 3 | Status: SHIPPED | OUTPATIENT
Start: 2025-05-12

## 2025-05-13 RX ORDER — TESTOSTERONE 1.62 MG/G
2 GEL TRANSDERMAL DAILY
Qty: 75 G | Refills: 0 | Status: SHIPPED | OUTPATIENT
Start: 2025-05-13

## 2025-05-13 RX ORDER — METHYLPHENIDATE HYDROCHLORIDE 54 MG/1
54 TABLET ORAL DAILY
Qty: 30 TABLET | Refills: 0 | Status: SHIPPED | OUTPATIENT
Start: 2025-05-13

## 2025-05-20 ENCOUNTER — OFFICE VISIT (OUTPATIENT)
Dept: FAMILY MEDICINE | Facility: CLINIC | Age: 20
End: 2025-05-20
Payer: COMMERCIAL

## 2025-05-20 VITALS
HEART RATE: 106 BPM | BODY MASS INDEX: 33.61 KG/M2 | HEIGHT: 65 IN | SYSTOLIC BLOOD PRESSURE: 134 MMHG | OXYGEN SATURATION: 98 % | DIASTOLIC BLOOD PRESSURE: 80 MMHG | RESPIRATION RATE: 16 BRPM | TEMPERATURE: 98.4 F

## 2025-05-20 DIAGNOSIS — F90.9 ATTENTION DEFICIT HYPERACTIVITY DISORDER (ADHD), UNSPECIFIED ADHD TYPE: Primary | ICD-10-CM

## 2025-05-20 DIAGNOSIS — F64.0 GENDER DYSPHORIA IN ADULT: ICD-10-CM

## 2025-05-20 DIAGNOSIS — R00.0 TACHYCARDIA: ICD-10-CM

## 2025-05-20 DIAGNOSIS — L70.0 ACNE VULGARIS: ICD-10-CM

## 2025-05-20 DIAGNOSIS — M24.9 KNEE JOINT HYPERMOBILITY: ICD-10-CM

## 2025-05-20 DIAGNOSIS — E61.1 LOW SERUM IRON: ICD-10-CM

## 2025-05-20 PROCEDURE — G2211 COMPLEX E/M VISIT ADD ON: HCPCS

## 2025-05-20 PROCEDURE — 3075F SYST BP GE 130 - 139MM HG: CPT

## 2025-05-20 PROCEDURE — 3079F DIAST BP 80-89 MM HG: CPT

## 2025-05-20 PROCEDURE — 99214 OFFICE O/P EST MOD 30 MIN: CPT | Mod: GC

## 2025-05-20 ASSESSMENT — PATIENT HEALTH QUESTIONNAIRE - PHQ9: SUM OF ALL RESPONSES TO PHQ QUESTIONS 1-9: 7

## 2025-05-20 NOTE — PROGRESS NOTES
Assessment & Plan     Attention deficit hyperactivity disorder (ADHD), unspecified ADHD type  Stable, 3 mo Refill provided.   - methylphenidate HCl ER, OSM, (CONCERTA) 54 MG CR tablet; Take 1 tablet (54 mg) by mouth daily.  - methylphenidate HCl ER, OSM, (CONCERTA) 54 MG CR tablet; Take 1 tablet (54 mg) by mouth daily.  - methylphenidate HCl ER, OSM, (CONCERTA) 54 MG CR tablet; Take 1 tablet (54 mg) by mouth daily.    Knee joint hypermobility  Patient concerned for joint hypermobility, 2/9 on beighton score and no other concerning findings on exam or fmx. Recommend PT       Gender dysphoria in adult  Acne vulgaris  Stable on Androgel, happy with current changes. Due for labs 07/2025    Tachycardia  Low serum iron  Chronic, Stable. -110, EKG in office showed Sinus Tachycardia without ischemia. TSH wnl. Labs notable for MELANI which could certainly be a cause of tachycardia. Could be med side effect from Wellbutrin and Concerta. Zio Patch completed, advised patient to mail it in soon to review data and will consider Cards referral based on data.           The longitudinal plan of care for the diagnosis(es)/condition(s) as documented were addressed during this visit. Due to the added complexity in care, I will continue to support Shen in the subsequent management and with ongoing continuity of care.      Nena Gotti is a 19 year old, presenting for the following health issues:  RECHECK and OTHER (Pt is concern of hypermotility in joint )      5/20/2025     4:00 PM   Additional Questions   Roomed by Ohn   Accompanied by Self         5/20/2025   Declines Weight   Did patient decline having their weight taken? Yes         5/20/2025    Information    services provided? No     HRT  - positive changes  - voice is getting deeper, cracking > he likes this  - body hair     Concerns for hypermobility   - prone to sprains   - gets joint pain - primarily the knees - feels deep inside the knees,  "worst is usually when he sits for a long time and then it hurts when he tries to move it and lingers. Walking on uneven surfaces for prolonged time.   - hyperextended kness  - has never dislocated joints   - bruises randomly     Tachycardia   - Has not been able to take Fe supplement   - Completed ziopatch > has to send it in   - Denies chest pain  - sometimes gets shortness of breath with activity, gets more winded than usual, feels that it is normal for him, but did not realize how frequently it was happening.               Review of Systems  Constitutional, HEENT, cardiovascular, pulmonary, GI, , musculoskeletal, neuro, skin, endocrine and psych systems are negative, except as otherwise noted.      Objective    /80   Pulse 106   Temp 98.4  F (36.9  C) (Temporal)   Resp 16   Ht 1.651 m (5' 5\")   SpO2 98%   BMI 33.61 kg/m    Body mass index is 33.61 kg/m .  Physical Exam  Vitals reviewed.   Constitutional:       General: He is not in acute distress.     Appearance: Normal appearance. He is not ill-appearing.   HENT:      Head: Normocephalic and atraumatic.   Eyes:      Conjunctiva/sclera: Conjunctivae normal.   Cardiovascular:      Rate and Rhythm: Normal rate.   Pulmonary:      Effort: Pulmonary effort is normal.   Musculoskeletal:      Comments: Knee Extension - 12 degrees bilaterally   Elbow Extension - 10 degrees (R), 12 Degrees (L)     Negative passive opposition of thumb to forearm   Negative hyperextension of V-MCP > 90 degrees  Cannot place palms to the floor without bending knees.        Skin:     Comments: No evidence of skin hyperextensibility.    Neurological:      General: No focal deficit present.      Mental Status: He is alert.   Psychiatric:         Mood and Affect: Mood normal.            Signed Electronically by: Caty Laurent DO    "

## 2025-05-21 RX ORDER — METHYLPHENIDATE HYDROCHLORIDE 54 MG/1
54 TABLET ORAL DAILY
Qty: 30 TABLET | Refills: 0 | Status: SHIPPED | OUTPATIENT
Start: 2025-05-21 | End: 2025-06-20

## 2025-05-21 RX ORDER — METHYLPHENIDATE HYDROCHLORIDE 54 MG/1
54 TABLET ORAL DAILY
Qty: 30 TABLET | Refills: 0 | Status: SHIPPED | OUTPATIENT
Start: 2025-06-20 | End: 2025-07-20

## 2025-05-21 RX ORDER — METHYLPHENIDATE HYDROCHLORIDE 54 MG/1
54 TABLET ORAL DAILY
Qty: 30 TABLET | Refills: 0 | Status: SHIPPED | OUTPATIENT
Start: 2025-07-20 | End: 2025-08-19

## 2025-06-23 ENCOUNTER — MYC REFILL (OUTPATIENT)
Dept: FAMILY MEDICINE | Facility: CLINIC | Age: 20
End: 2025-06-23
Payer: COMMERCIAL

## 2025-06-23 DIAGNOSIS — J30.89 SEASONAL ALLERGIC RHINITIS DUE TO OTHER ALLERGIC TRIGGER: ICD-10-CM

## 2025-06-23 DIAGNOSIS — J45.20 MILD INTERMITTENT ASTHMA WITHOUT COMPLICATION: ICD-10-CM

## 2025-06-24 RX ORDER — ALBUTEROL SULFATE 90 UG/1
1-2 INHALANT RESPIRATORY (INHALATION) EVERY 4 HOURS PRN
Qty: 18 G | Refills: 3 | Status: SHIPPED | OUTPATIENT
Start: 2025-06-24

## 2025-06-24 NOTE — TELEPHONE ENCOUNTER
"Request for medication refill:    Medication Name:       albuterol (PROAIR HFA/PROVENTIL HFA/VENTOLIN HFA) 108 (90 Base) MCG/ACT inhaler       Providers if patient needs an appointment and you are willing to give a one month supply please refill for one month and  send a MyChart using \".SMILLIMITEDREFILL\" .Or route chart to \"[facility] \" . And use the phrase \" SMIRXFOLLOWUP\"To call patient and inform of limited refill and providers request to make an appointment. (Giving one month refill in non controlled medications is strongly recommended before denial)    If refill has been denied, meaning absolutely no refills without visit, please complete the smart phrase \".SMIRXREFUSE\" and route it to the \"[facility] MED REFILLS\"  pool to inform the patient and the pharmacy.    Maico Lau MA     "

## 2025-07-21 ENCOUNTER — MYC REFILL (OUTPATIENT)
Dept: FAMILY MEDICINE | Facility: CLINIC | Age: 20
End: 2025-07-21
Payer: COMMERCIAL

## 2025-07-21 DIAGNOSIS — F90.9 ATTENTION DEFICIT HYPERACTIVITY DISORDER (ADHD), UNSPECIFIED ADHD TYPE: ICD-10-CM

## 2025-07-21 DIAGNOSIS — G47.09 OTHER INSOMNIA: ICD-10-CM

## 2025-07-21 RX ORDER — MIRTAZAPINE 7.5 MG/1
7.5 TABLET, FILM COATED ORAL AT BEDTIME
Qty: 30 TABLET | Refills: 2 | Status: SHIPPED | OUTPATIENT
Start: 2025-07-21

## 2025-07-21 RX ORDER — METHYLPHENIDATE HYDROCHLORIDE 54 MG/1
54 TABLET ORAL DAILY
Qty: 30 TABLET | Refills: 0 | Status: CANCELLED | OUTPATIENT
Start: 2025-07-21

## 2025-07-21 NOTE — TELEPHONE ENCOUNTER
"Request for medication refill:    Medication Name: mirtazapine (REMERON) 7.5 MG tablet     Providers if patient needs an appointment and you are willing to give a one month supply please refill for one month and  send a MyChart using \".SMILLIMITEDREFILL\" .Or route chart to \"[facility] \" . And use the phrase \" SMIRXFOLLOWUP\"To call patient and inform of limited refill and providers request to make an appointment. (Giving one month refill in non controlled medications is strongly recommended before denial)    If refill has been denied, meaning absolutely no refills without visit, please complete the smart phrase \".SMIRXREFUSE\" and route it to the \"[facility] MED REFILLS\"  pool to inform the patient and the pharmacy.    Juan Orozco, CMA     "

## 2025-07-26 ENCOUNTER — MYC REFILL (OUTPATIENT)
Dept: FAMILY MEDICINE | Facility: CLINIC | Age: 20
End: 2025-07-26
Payer: COMMERCIAL

## 2025-07-26 DIAGNOSIS — F64.0 GENDER DYSPHORIA IN ADULT: ICD-10-CM

## 2025-07-28 RX ORDER — TESTOSTERONE 1.62 MG/G
2 GEL TRANSDERMAL DAILY
Qty: 75 G | Refills: 0 | Status: SHIPPED | OUTPATIENT
Start: 2025-07-28

## 2025-07-28 NOTE — TELEPHONE ENCOUNTER
"Request for medication refill:    Medication Name: testosterone (ANDROGEL 1.62 % PUMP) 20.25 MG/ACT gel     Providers if patient needs an appointment and you are willing to give a one month supply please refill for one month and  send a MyChart using \".SMILLIMITEDREFILL\" .Or route chart to \"[facility] \" . And use the phrase \" SMIRXFOLLOWUP\"To call patient and inform of limited refill and providers request to make an appointment. (Giving one month refill in non controlled medications is strongly recommended before denial)    If refill has been denied, meaning absolutely no refills without visit, please complete the smart phrase \".SMIRXREFUSE\" and route it to the \"[facility] MED REFILLS\"  pool to inform the patient and the pharmacy.    Juan Orozco, CMA     "

## 2025-07-28 NOTE — TELEPHONE ENCOUNTER
Duplicate request for methylphenidate HCl ER, OSM, (CONCERTA) 54 MG CR tablet script sent 7/21/25      Roseline Winters RN

## 2025-08-07 ENCOUNTER — OFFICE VISIT (OUTPATIENT)
Dept: FAMILY MEDICINE | Facility: CLINIC | Age: 20
End: 2025-08-07
Payer: COMMERCIAL

## 2025-08-07 VITALS
BODY MASS INDEX: 39.49 KG/M2 | OXYGEN SATURATION: 95 % | DIASTOLIC BLOOD PRESSURE: 79 MMHG | TEMPERATURE: 98.2 F | RESPIRATION RATE: 16 BRPM | HEART RATE: 92 BPM | WEIGHT: 237 LBS | SYSTOLIC BLOOD PRESSURE: 139 MMHG | HEIGHT: 65 IN

## 2025-08-07 DIAGNOSIS — M22.2X1 PATELLOFEMORAL ARTHRALGIA OF BOTH KNEES: ICD-10-CM

## 2025-08-07 DIAGNOSIS — J45.20 MILD INTERMITTENT ASTHMA WITHOUT COMPLICATION: ICD-10-CM

## 2025-08-07 DIAGNOSIS — M22.2X2 PATELLOFEMORAL ARTHRALGIA OF BOTH KNEES: ICD-10-CM

## 2025-08-07 DIAGNOSIS — M24.9 KNEE JOINT HYPERMOBILITY: ICD-10-CM

## 2025-08-07 DIAGNOSIS — M21.41 PES PLANUS OF RIGHT FOOT: ICD-10-CM

## 2025-08-07 DIAGNOSIS — M76.71 PERONEAL TENDONITIS, RIGHT: ICD-10-CM

## 2025-08-07 DIAGNOSIS — F64.0 GENDER DYSPHORIA IN ADULT: ICD-10-CM

## 2025-08-07 DIAGNOSIS — M79.18 MYALGIA, LOWER LEG: Primary | ICD-10-CM

## 2025-08-07 DIAGNOSIS — R00.0 TACHYCARDIA: ICD-10-CM

## 2025-08-07 DIAGNOSIS — R79.0 LOW FERRITIN: ICD-10-CM

## 2025-08-07 LAB
ALBUMIN SERPL BCG-MCNC: 4.5 G/DL (ref 3.5–5.2)
ALP SERPL-CCNC: 121 U/L (ref 40–150)
ALT SERPL W P-5'-P-CCNC: 19 U/L (ref 0–70)
ANION GAP SERPL CALCULATED.3IONS-SCNC: 12 MMOL/L (ref 7–15)
AST SERPL W P-5'-P-CCNC: 24 U/L (ref 0–45)
BASOPHILS # BLD AUTO: 0.1 10E3/UL (ref 0–0.2)
BASOPHILS NFR BLD AUTO: 1 %
BILIRUB SERPL-MCNC: <0.2 MG/DL
BUN SERPL-MCNC: 10 MG/DL (ref 6–20)
CALCIUM SERPL-MCNC: 9.9 MG/DL (ref 8.8–10.4)
CHLORIDE SERPL-SCNC: 105 MMOL/L (ref 98–107)
CHOLEST SERPL-MCNC: 168 MG/DL
CK SERPL-CCNC: 142 U/L (ref 26–308)
CREAT SERPL-MCNC: 0.76 MG/DL (ref 0.51–1.17)
EGFRCR SERPLBLD CKD-EPI 2021: >90 ML/MIN/1.73M2
EOSINOPHIL # BLD AUTO: 0.1 10E3/UL (ref 0–0.7)
EOSINOPHIL NFR BLD AUTO: 1 %
ERYTHROCYTE [DISTWIDTH] IN BLOOD BY AUTOMATED COUNT: 15.1 % (ref 10–15)
FASTING STATUS PATIENT QL REPORTED: NORMAL
FASTING STATUS PATIENT QL REPORTED: NORMAL
GLUCOSE SERPL-MCNC: 89 MG/DL (ref 70–99)
HCO3 SERPL-SCNC: 25 MMOL/L (ref 22–29)
HCT VFR BLD AUTO: 40.4 % (ref 35–53)
HDLC SERPL-MCNC: 43 MG/DL
HGB BLD-MCNC: 12.7 G/DL (ref 11.7–17.7)
IMM GRANULOCYTES # BLD: 0 10E3/UL
IMM GRANULOCYTES NFR BLD: 0 %
LDLC SERPL CALC-MCNC: 98 MG/DL
LYMPHOCYTES # BLD AUTO: 1.7 10E3/UL (ref 0.8–5.3)
LYMPHOCYTES NFR BLD AUTO: 25 %
MAGNESIUM SERPL-MCNC: 2.3 MG/DL (ref 1.7–2.3)
MCH RBC QN AUTO: 24.5 PG (ref 26.5–33)
MCHC RBC AUTO-ENTMCNC: 31.4 G/DL (ref 31.5–36.5)
MCV RBC AUTO: 78 FL (ref 78–100)
MONOCYTES # BLD AUTO: 0.6 10E3/UL (ref 0–1.3)
MONOCYTES NFR BLD AUTO: 8 %
NEUTROPHILS # BLD AUTO: 4.4 10E3/UL (ref 1.6–8.3)
NEUTROPHILS NFR BLD AUTO: 64 %
NONHDLC SERPL-MCNC: 125 MG/DL
PHOSPHATE SERPL-MCNC: 2.6 MG/DL (ref 2.5–4.5)
PLATELET # BLD AUTO: 379 10E3/UL (ref 150–450)
POTASSIUM SERPL-SCNC: 4.5 MMOL/L (ref 3.4–5.3)
PROT SERPL-MCNC: 7.7 G/DL (ref 6.4–8.3)
RBC # BLD AUTO: 5.19 10E6/UL (ref 3.8–5.9)
SODIUM SERPL-SCNC: 142 MMOL/L (ref 135–145)
TRIGL SERPL-MCNC: 136 MG/DL
TSH SERPL DL<=0.005 MIU/L-ACNC: 2.22 UIU/ML (ref 0.3–4.2)
WBC # BLD AUTO: 6.9 10E3/UL (ref 4–11)

## 2025-08-07 RX ORDER — IRON,CARBONYL/ASCORBIC ACID 65MG-125MG
1 TABLET ORAL DAILY
Qty: 90 TABLET | Refills: 3 | Status: SHIPPED | OUTPATIENT
Start: 2025-08-07

## 2025-08-07 ASSESSMENT — PATIENT HEALTH QUESTIONNAIRE - PHQ9
SUM OF ALL RESPONSES TO PHQ QUESTIONS 1-9: 5
SUM OF ALL RESPONSES TO PHQ QUESTIONS 1-9: 5
10. IF YOU CHECKED OFF ANY PROBLEMS, HOW DIFFICULT HAVE THESE PROBLEMS MADE IT FOR YOU TO DO YOUR WORK, TAKE CARE OF THINGS AT HOME, OR GET ALONG WITH OTHER PEOPLE: SOMEWHAT DIFFICULT

## 2025-08-11 LAB — TESTOST SERPL-MCNC: 440 NG/DL (ref 8–950)

## 2025-09-01 ENCOUNTER — MYC REFILL (OUTPATIENT)
Dept: FAMILY MEDICINE | Facility: CLINIC | Age: 20
End: 2025-09-01
Payer: COMMERCIAL

## 2025-09-01 DIAGNOSIS — F64.0 GENDER DYSPHORIA IN ADULT: ICD-10-CM

## 2025-09-04 RX ORDER — TESTOSTERONE 1.62 MG/G
2 GEL TRANSDERMAL DAILY
Qty: 75 G | Refills: 0 | Status: SHIPPED | OUTPATIENT
Start: 2025-09-04